# Patient Record
Sex: FEMALE | Race: BLACK OR AFRICAN AMERICAN | NOT HISPANIC OR LATINO | Employment: UNEMPLOYED | URBAN - METROPOLITAN AREA
[De-identification: names, ages, dates, MRNs, and addresses within clinical notes are randomized per-mention and may not be internally consistent; named-entity substitution may affect disease eponyms.]

---

## 2020-03-20 ENCOUNTER — APPOINTMENT (OUTPATIENT)
Dept: ULTRASOUND IMAGING | Facility: CLINIC | Age: 30
End: 2020-03-20
Attending: PHYSICIAN ASSISTANT
Payer: MEDICAID

## 2020-03-20 ENCOUNTER — HOSPITAL ENCOUNTER (EMERGENCY)
Facility: CLINIC | Age: 30
Discharge: HOME | End: 2020-03-21
Attending: PHYSICIAN ASSISTANT | Admitting: PHYSICIAN ASSISTANT
Payer: MEDICAID

## 2020-03-20 DIAGNOSIS — Z3A.16 16 WEEKS GESTATION OF PREGNANCY: ICD-10-CM

## 2020-03-20 DIAGNOSIS — R42 LIGHTHEADEDNESS: ICD-10-CM

## 2020-03-20 DIAGNOSIS — R10.9 ABDOMINAL CRAMPING: ICD-10-CM

## 2020-03-20 LAB
ALBUMIN UR-MCNC: NEGATIVE MG/DL
ANION GAP SERPL CALCULATED.3IONS-SCNC: 7 MMOL/L (ref 3–14)
APPEARANCE UR: CLEAR
BACTERIA #/AREA URNS HPF: ABNORMAL /HPF
BASOPHILS # BLD AUTO: 0 10E9/L (ref 0–0.2)
BASOPHILS NFR BLD AUTO: 0.4 %
BILIRUB UR QL STRIP: NEGATIVE
BUN SERPL-MCNC: 6 MG/DL (ref 7–30)
CALCIUM SERPL-MCNC: 9.4 MG/DL (ref 8.5–10.1)
CHLORIDE SERPL-SCNC: 104 MMOL/L (ref 94–109)
CO2 SERPL-SCNC: 24 MMOL/L (ref 20–32)
COLOR UR AUTO: ABNORMAL
CREAT SERPL-MCNC: 0.58 MG/DL (ref 0.52–1.04)
DIFFERENTIAL METHOD BLD: NORMAL
EOSINOPHIL # BLD AUTO: 0.1 10E9/L (ref 0–0.7)
EOSINOPHIL NFR BLD AUTO: 1.3 %
ERYTHROCYTE [DISTWIDTH] IN BLOOD BY AUTOMATED COUNT: 13.1 % (ref 10–15)
GFR SERPL CREATININE-BSD FRML MDRD: >90 ML/MIN/{1.73_M2}
GLUCOSE SERPL-MCNC: 82 MG/DL (ref 70–99)
GLUCOSE UR STRIP-MCNC: NEGATIVE MG/DL
HCG UR QL: POSITIVE
HCT VFR BLD AUTO: 35.8 % (ref 35–47)
HGB BLD-MCNC: 11.7 G/DL (ref 11.7–15.7)
HGB UR QL STRIP: NEGATIVE
IMM GRANULOCYTES # BLD: 0.1 10E9/L (ref 0–0.4)
IMM GRANULOCYTES NFR BLD: 1 %
KETONES UR STRIP-MCNC: NEGATIVE MG/DL
LEUKOCYTE ESTERASE UR QL STRIP: NEGATIVE
LYMPHOCYTES # BLD AUTO: 2.7 10E9/L (ref 0.8–5.3)
LYMPHOCYTES NFR BLD AUTO: 32.7 %
MCH RBC QN AUTO: 30.1 PG (ref 26.5–33)
MCHC RBC AUTO-ENTMCNC: 32.7 G/DL (ref 31.5–36.5)
MCV RBC AUTO: 92 FL (ref 78–100)
MONOCYTES # BLD AUTO: 1 10E9/L (ref 0–1.3)
MONOCYTES NFR BLD AUTO: 11.7 %
NEUTROPHILS # BLD AUTO: 4.4 10E9/L (ref 1.6–8.3)
NEUTROPHILS NFR BLD AUTO: 52.9 %
NITRATE UR QL: NEGATIVE
NRBC # BLD AUTO: 0 10*3/UL
NRBC BLD AUTO-RTO: 0 /100
PH UR STRIP: 6 PH (ref 5–7)
PLATELET # BLD AUTO: 229 10E9/L (ref 150–450)
POTASSIUM SERPL-SCNC: 3.7 MMOL/L (ref 3.4–5.3)
RBC # BLD AUTO: 3.89 10E12/L (ref 3.8–5.2)
RBC #/AREA URNS AUTO: <1 /HPF (ref 0–2)
SODIUM SERPL-SCNC: 135 MMOL/L (ref 133–144)
SOURCE: ABNORMAL
SP GR UR STRIP: 1 (ref 1–1.03)
SQUAMOUS #/AREA URNS AUTO: <1 /HPF (ref 0–1)
UROBILINOGEN UR STRIP-MCNC: NORMAL MG/DL (ref 0–2)
WBC # BLD AUTO: 8.2 10E9/L (ref 4–11)
WBC #/AREA URNS AUTO: 0 /HPF (ref 0–5)

## 2020-03-20 PROCEDURE — 76805 OB US >/= 14 WKS SNGL FETUS: CPT

## 2020-03-20 PROCEDURE — 81025 URINE PREGNANCY TEST: CPT | Performed by: PHYSICIAN ASSISTANT

## 2020-03-20 PROCEDURE — 25800030 ZZH RX IP 258 OP 636: Performed by: PHYSICIAN ASSISTANT

## 2020-03-20 PROCEDURE — 96361 HYDRATE IV INFUSION ADD-ON: CPT

## 2020-03-20 PROCEDURE — 80048 BASIC METABOLIC PNL TOTAL CA: CPT | Performed by: PHYSICIAN ASSISTANT

## 2020-03-20 PROCEDURE — 99284 EMERGENCY DEPT VISIT MOD MDM: CPT | Mod: 25

## 2020-03-20 PROCEDURE — 85025 COMPLETE CBC W/AUTO DIFF WBC: CPT | Performed by: PHYSICIAN ASSISTANT

## 2020-03-20 PROCEDURE — 25000132 ZZH RX MED GY IP 250 OP 250 PS 637: Performed by: PHYSICIAN ASSISTANT

## 2020-03-20 PROCEDURE — 81001 URINALYSIS AUTO W/SCOPE: CPT | Performed by: PHYSICIAN ASSISTANT

## 2020-03-20 PROCEDURE — 96360 HYDRATION IV INFUSION INIT: CPT

## 2020-03-20 RX ORDER — MECLIZINE HYDROCHLORIDE 25 MG/1
25 TABLET ORAL ONCE
Status: COMPLETED | OUTPATIENT
Start: 2020-03-20 | End: 2020-03-20

## 2020-03-20 RX ADMIN — MECLIZINE HYDROCHLORIDE 25 MG: 25 TABLET ORAL at 22:17

## 2020-03-20 RX ADMIN — SODIUM CHLORIDE 1000 ML: 9 INJECTION, SOLUTION INTRAVENOUS at 22:15

## 2020-03-20 ASSESSMENT — ENCOUNTER SYMPTOMS
LIGHT-HEADEDNESS: 1
FEVER: 0
DYSURIA: 0
DIFFICULTY URINATING: 0
ABDOMINAL PAIN: 1
HEADACHES: 0
DIZZINESS: 1
NUMBNESS: 0
NAUSEA: 1
CONSTIPATION: 0
VOMITING: 1
WEAKNESS: 0
DIARRHEA: 0

## 2020-03-20 NOTE — ED AVS SNAPSHOT
[facility] Emergency Department  [address]  [facility] [address]  Phone:  [phone]  Fax:  [phone]                                    Svetlana Baldwin   MRN: [id number]    Department:  [facility] Emergency Department   Date of Visit:  3/20/2020           After Visit Summary Signature Page    I have received my discharge instructions, and my questions have been answered. I have discussed any challenges I see with this plan with the nurse or doctor.    ..........................................................................................................................................  Patient/Patient Representative Signature      ..........................................................................................................................................  Patient Representative Print Name and Relationship to Patient    ..................................................               ................................................  Date                                   Time    ..........................................................................................................................................  Reviewed by Signature/Title    ...................................................              ..............................................  Date                                               Time          22EPIC Rev 08/18

## 2020-03-21 VITALS
TEMPERATURE: 98.4 F | SYSTOLIC BLOOD PRESSURE: 105 MMHG | RESPIRATION RATE: 16 BRPM | OXYGEN SATURATION: 100 % | HEART RATE: 79 BPM | DIASTOLIC BLOOD PRESSURE: 70 MMHG

## 2020-03-21 NOTE — ED PROVIDER NOTES
History     Chief Complaint:  Dizziness    The history is provided by the patient. The history is limited by a language barrier. A  was used.      Svetlana Baldwin is a 30 year old, English-speaking female, , approximately 15 weeks pregnant based on her last menstrual period four months ago, who presents for evaluation of dizziness. Throughout this pregnancy, the patient has been experiencing intermittent lightheaded sensation and generalized weakness which has become more consistent, thus prompting presentation to the ED. Here, she does endorse feeling dizzy. She describes it as feeling off balance when she moves and sometimes her vision goes dark upon standing. She denies any room spinning sensation, and states the dizziness improves when she lies down. She has had lower abdominal pain and been nauseated and most recently vomited yesterday. She denies any current headache, fever, weakness or numbness in extremities, stool changes, urinary symptoms, or vaginal bleeding. She has yet to see an OB for this pregnancy and has received no prenatal care; she found out she was pregnant via a home test. She recently [place] to this country also; she denies having any complications with her last pregnancy. She also denies any history of vertigo.     Allergies:  No Known Allergies     Medications:    The patient is currently on no regular medications.    Past Medical History:    Migraines    Past Surgical History:    Unknown surgical history.     Family History:    Unknown family history.     Social History:  Marital Status:   [2]  Negative for tobacco use.  Negative for alcohol use.     Review of Systems   Constitutional: Negative for fever.   Eyes: Positive for visual disturbance.   Gastrointestinal: Positive for abdominal pain, nausea and vomiting. Negative for constipation and diarrhea.   Genitourinary: Negative for difficulty urinating, dysuria and vaginal bleeding.    Neurological: Positive for dizziness and light-headedness. Negative for weakness, numbness and headaches.   All other systems reviewed and are negative.    Physical Exam     Patient Vitals for the past 24 hrs:   BP Temp Pulse Heart Rate Resp SpO2   03/21/20 0033 -- -- -- -- 16 --   03/21/20 0000 105/70 -- 79 -- -- 100 %   03/20/20 2330 112/71 -- 79 -- -- 100 %   03/20/20 2245 -- -- -- -- -- 100 %   03/20/20 2230 108/64 -- 72 -- -- 100 %   03/20/20 2135 118/64 98.4  F (36.9  C) -- 85 20 97 %      Orthostatic Vitals taken @ 2210:  Standing Orthostatic BP: 107/73 Standing Orthostatic Pulse: 77 bpm  Sitting Orthostatic BP: 111/73 Sitting Orthostatic Pulse: 79 bpm  Lying Orthostatic BP: 120/70 Lying Orthostatic Pulse: 85 bpm    Physical Exam  General: Non-toxic appearing. Resting comfortably on the bed.   Skin: Good turgor, no rash, no unusual bruising or prominent lesions.  HEENT: Head: Normocephalic, atraumatic, no visible or palpable masses, depressions, or scarring.  Eyes: Conjunctiva clear, sclera non-icteric, EOM intact, PERRL.   Throat/pharynx: Mucous membranes moist, no mucosal lesions. Mucosa non-inflamed, no tonsillar hypertrophy or exudate.   Neck: Supple, without lesions or adenopathy.  No neck stiffness.  Cardiac: Normal rate and regular rhythm, no murmur or gallop.   Lungs: Clear to auscultation and percussion   Abdomen: Mild discomfort throughout the lower abdomen without focal tenderness, rebound or guarding.  No pain at McBurney's.  Negative Carroll's.  Musculoskeletal: Normal gait and station. Full strength in upper and lower extremities.   Neurologic: Alert and oriented x3. GCS 15. CN II-VII intact. Normal finger to nose, rapid hand movements, heel to shin. No pronator drift.   Psychiatric: Intact recent and remote memory, judgment and insight, normal mood and affect.     Emergency Department Course   Imaging:  Radiographic findings were communicated with the patient who voiced understanding of the  findings.  US OB, >14 weeks w Transvaginal:  1.  Single living intrauterine gestation.   2.  Based on this ultrasound, composite age of 16 weeks 5 days with EDC 2020.   3.  No abnormalities are evident, as per radiology.     Laboratory:  CBC: WBC: 8.2, HGB: 11.7, PLT: 229  BMP: BUN: 6 (L), o/w WNL (Creatinine: 0.58)    UA with Microscopic: Bacteria: Few (A), o/w WNL   HCG UPT: Positive (A)    Procedures:  None     Interventions:  2215 NS 1L IV  7 Meclizine, 25 mg, PO    Emergency Department Course:  Nursing notes and vitals reviewed.   : I performed an exam of the patient as documented above.      IV was inserted and blood was drawn for laboratory testing, results above. Medicine administered as documented above.   The patient provided a urine sample here in the emergency department. This was sent for laboratory testing, findings above.     2215: Orthostatics completed, as noted above.     The patient was sent for an OB ultrasound while in the emergency department, results above.      0017: I rechecked the patient and discussed the results of her workup thus far. She feels improved after the interventions above.     Findings and plan explained to the Patient. Patient discharged home with instructions regarding supportive care, medications, and reasons to return. The importance of close follow-up was reviewed. She was given a referral for an OBGYN.     I personally reviewed the laboratory and imaging results with the Patient and answered all related questions prior to discharge.    Impression & Plan      Medical Decision Making:  Svetlana Baldwin is a 30 year old female who presents with lightheaded sensation and abdominal cramping in pregnancy.  Details of the patient's history can be noted in the HPI.  Differentials considered included CVA, cerebral venous thrombosis, vasovagal, appendicitis, spontaneous , round ligament pain, UTI, pyelonephritis, gallbladder pathology, amongst  others.  On my exam, patient was neurologically intact.  She had mild lower abdominal cramping without focal tenderness.  Basic labs were obtained, which showed no acute electrolyte abnormalities.  She was mildly symptomatic on orthostatic blood pressures.  Fluids were given, and patient felt improved after this.  Urine was negative for infection.  Pelvic ultrasound indicated intrauterine pregnancy at 16 weeks and 5 days.    The patient otherwise looks comfortable here, I suspect that she has normal abdominal cramping in pregnancy.  We did discuss prenatal care and including adding a daily vitamin, limiting caffeine, etc.  She was given local OB/GYN information and she will call Monday to schedule follow-up.  Suspect that some of her lightheaded sensation is due to volume depletion and volume changes given her pregnancy.  She otherwise is neurologically intact and has no other symptoms to suggest cerebral venous thrombosis or CVA.  I encouraged her to stay well-hydrated at home, and have slower movements when going from sitting or lying to standing.  She will return if she has changing worsening abdominal pain, persistent vaginal bleeding, severe headache, numbness or weakness, new concerns.  All questions were answered prior to discharge.  She was in agreement with the treatment plan as stated above.    Diagnosis:    ICD-10-CM    1. 16 weeks gestation of pregnancy  Z3A.16    2. Abdominal cramping  R10.9    3. Lightheadedness  R42        Disposition:  discharged to home    Scribe Disclosure:  I, Gogo Alvin, am serving as a scribe on 3/20/2020 at 9:44 PM to personally document services performed by Liliana Hughes PA based on my observations and the provider's statements to me.     3/20/2020   [facility] EMERGENCY DEPARTMENT      This was created at least in part with a voice recognition software. Mistakes/typos may be present.      Liliana Hughes PA  03/21/20 0151

## 2020-03-21 NOTE — DISCHARGE INSTRUCTIONS
Today, your ultrasound shows that you have a pregnancy at about 16 weeks and 5 days.  The baby looks normal.  Your blood work is normal.  Your blood pressure was slightly low when used good.  Make sure you are staying well-hydrated at home.  Move more slowly than normal to help decrease her symptoms.  Call the OB/GYN number above to schedule follow-up.  Take a daily prenatal vitamin if possible.

## 2020-03-21 NOTE — ED TRIAGE NOTES
Pt states lightheadedness that changes with movement, also notes lower abdominal pain. Denies cough, dyspnea, or chest pain.   Pt approx 15 weeks gestation. ABCs intact GCS 15 Has not had prenatal care

## 2020-05-20 ENCOUNTER — TELEPHONE (OUTPATIENT)
Dept: OBGYN | Facility: CLINIC | Age: 30
End: 2020-05-20

## 2020-05-20 ENCOUNTER — PRENATAL OFFICE VISIT (OUTPATIENT)
Dept: NURSING | Facility: CLINIC | Age: 30
End: 2020-05-20

## 2020-05-20 DIAGNOSIS — O09.92 SUPERVISION OF HIGH RISK PREGNANCY IN SECOND TRIMESTER: Primary | ICD-10-CM

## 2020-05-20 PROCEDURE — 99207 ZZC NO CHARGE NURSE ONLY: CPT

## 2020-05-20 RX ORDER — PRENATAL VIT/IRON FUM/FOLIC AC 27MG-0.8MG
1 TABLET ORAL DAILY
Qty: 90 TABLET | Refills: 3
Start: 2020-05-20 | End: 2020-05-27

## 2020-05-20 SDOH — ECONOMIC STABILITY: FOOD INSECURITY: WITHIN THE PAST 12 MONTHS, THE FOOD YOU BOUGHT JUST DIDN'T LAST AND YOU DIDN'T HAVE MONEY TO GET MORE.: NEVER TRUE

## 2020-05-20 SDOH — ECONOMIC STABILITY: FOOD INSECURITY: WITHIN THE PAST 12 MONTHS, YOU WORRIED THAT YOUR FOOD WOULD RUN OUT BEFORE YOU GOT MONEY TO BUY MORE.: NEVER TRUE

## 2020-05-20 SDOH — ECONOMIC STABILITY: TRANSPORTATION INSECURITY
IN THE PAST 12 MONTHS, HAS THE LACK OF TRANSPORTATION KEPT YOU FROM MEDICAL APPOINTMENTS OR FROM GETTING MEDICATIONS?: NO

## 2020-05-20 SDOH — ECONOMIC STABILITY: TRANSPORTATION INSECURITY
IN THE PAST 12 MONTHS, HAS LACK OF TRANSPORTATION KEPT YOU FROM MEETINGS, WORK, OR FROM GETTING THINGS NEEDED FOR DAILY LIVING?: NO

## 2020-05-20 SDOH — ECONOMIC STABILITY: INCOME INSECURITY: HOW HARD IS IT FOR YOU TO PAY FOR THE VERY BASICS LIKE FOOD, HOUSING, MEDICAL CARE, AND HEATING?: NOT HARD AT ALL

## 2020-05-20 SDOH — HEALTH STABILITY: MENTAL HEALTH: HOW OFTEN DO YOU HAVE A DRINK CONTAINING ALCOHOL?: NEVER

## 2020-05-20 NOTE — PROGRESS NOTES
Chief Complaint   Patient presents with     Prenatal Care     New Prenatal Nurse Telephone Visit   25w3d      Estimated Date of Delivery: Aug 30, 2020    Initial There were no vitals taken for this visit. There is no height or weight on file to calculate BMI.  BP completed using cuff size: NA (Not Taken)    Questioned patient about current smoking habits.  Pt. has never smoked.        New Prenatal telephone visit done with [facility]  # [id number] and . Prenatal book and folder (containing standard educational hand-outs and brochures) to be given to patient at NPN visit. Information in folder reviewed. Questions answered. Brochure given on optional screening available to assess chromosomal anomalies. Pt advised to call the clinic if she has any questions or concerns related to her pregnancy. Prenatal labs obtained. New prenatal visit scheduled on 20 with Dr Das.        No results found for: PAP        Patient supplied answers from flow sheet for:  Prenatal OB Questionnaire.  Past Medical History  Diabetes?: No  Hypertension : No  Heart disease, mitral valve prolapse or rheumatic fever?: No  An autoimmune disease such as lupus or rheumatoid arthritis?: No  Kidney disease or urinary tract infection?: No  Epilepsy, seizures or spells?: No  Migraine headaches?: No  A stroke or loss of function or sensation?: No  Any other neurological problems?: No  Have you ever been treated for depression?: No  Are you having problems with crying spells or loss of self-esteem?: No  Have you ever required psychiatric care?: No  Have you ever had hepatitis, liver disease or jaundice?: No  Have you been treated for blood clots in your veins, deep vein thromosis, inflammation in the veins, thrombosis, phlebitis, pulmonary embolism or varicosities?: No  Have you had excessive bleeding after surgery or dental work?: No  Do you bleed more than other women after a cut or scratch?: No  Do you have a history of anemia?:  No  Have you ever had thyroid problems or taken thyroid medication?: No   Do you have any endocrine problems?: No  Have you ever been in a major accident or suffered serious trauma?: No  Within the last year, has anyone hit, slapped, kicked or otherwise hurt you?: No  In the last year, has anyone forced you to have sex when you didn't want to?: No    Past Medical History 2   Have you ever received a blood transfusion?: No  Would you refuse a blood transfusion if a doctor judged it to be medically necessary?: No   If you answered Yes, would you rather die than receive a blood transfusion?: No  If you answered Yes, is this for Anabaptism reasons?: No  Does anyone in your home smoke?: No  Do you use tobacco products?: No  Do you drink beer, wine or hard liquor?: No  Do you use any of the following: marijuana, speed, cocaine, heroin, hallucinogens or other drugs?: No   Is your blood type Rh negative?: No  Have you ever had abnormal antibodies in your blood?: Unknown  Have you ever had asthma?: No  Have you ever had tuberculosis?: No  Do you have any allergies to drugs or over-the-counter medications?: No  Allergies: Dust Mites, Aspartame, Ethanol, Venlafaxine, Hydrochloride, Sertraline: No  Have you had any breast problems?: No  Have you ever ?: (!) Yes  Have you had any gynecological surgical procedures such as cervical conization, a LEEP procedure, laser treatment, cryosurgery of the cervix or a dilation and curettage, etc?: No  Have you ever had any other surgical procedures?: No  Have you been hospitalized for a nonsurgical reason excluding normal delivery?: No  Have you ever had any anesthetic complications?: No  Have you ever had an abnormal pap smear?: Unknown    Past Medical History (Continued)  Do you have a history of abnormalities of the uterus?: Unknown  Did your mother take JEREMIAH or any other hormones when she was pregnant with you?: Unknown  Did it take you more than a year to become pregnant?:  No  Have you ever been evaluated or treated for infertility?: No  Is there a history of medical problems in your family, which you feel may be important to this pregnancy?: No  Do you have any other problems we have not asked about which you feel may be important to this pregnancy?: No    Symptoms since last menstrual period  Current medications, including over-the-counter medications, you are using? (If not applicable answer none): pnv  Will the patient be 35 years old or older at the time of delivery?: No    Has the patient, baby's father or anyone in either family had:  Thalassemia (Italian, Greek, Mediterranean or  background only) and an MCV result less than 80?: Unknown  Neural tube defect such as meningomyelocele, spina bifida or anencephaly?: No  Congenital heart defect?: No  Down's Syndrome?: No  Lorenzo-Sachs disease (Evangelical, Cajun, Croatian-Archer)?: No  Sickle cell disease or trait ()?: No  Hemophilia or other inherited problems of blood?: No  Muscular dystrophy?: No  Cystic fibrosis?: No  Hartford's chorea?: No  Mental retardation/autism?: No  If yes, was the person tested for fragile X?: No  Any other inherited genetic or chromosomal disorder?: No  Maternal metabolic disorder (e.g Insulin-dependent diabetes, PKU)?: No  A child with birth defects not listed above?: No  Recurrent pregnancy loss or stillbirth?: No   Has the patient had any medications/street drugs/alcohol since her last menstrual period?: No  Does the patient or baby's father have any other genetic risks?: No    Infection History   Do you object to being tested for Hepatitis B?: No  Do you object to being tested for HIV?: No   Do you feel that you are at high risk for coming in contact with the AIDS virus?: No  Have you ever been treated for tuberculosis?: No  Have you ever had a positive skin test for tuberculosis?: No  Do you live with someone who has tuberculosis?: No  Have you ever been exposed to tuberculosis?: No  Do you  have genital herpes?: No  Does your partner have genital herpes?: No  Have you had a viral illness since your last period?: No  Have you ever had gonorrhea, chlamydia, syphilis, venereal warts, trichomoniasis, pelvic inflammatory disease or any other sexually transmitted disease?: No  Do you know if you are a genital group B streptococcus carrier?: Unknown  Have you had chicken pox/varicella?: Unknown   Have you been vaccinated against chicken Pox?: Unknown  Have you had any other infectious diseases?: No    Sari Gomes RN

## 2020-05-20 NOTE — TELEPHONE ENCOUNTER
calling:  G2.P1  Moved here in November from the [place].  Was seen in ER 3/20. Did not seek prenatal care because did not have insurance.    Patient is requesting to establish care with [facility] .  Has not had any prenatal care.   Per [facility] protocol, patients are equal to or greater than 24 weeks need prior approval from [facility] before scheduling any prenatal appointments.      2 Para 1    LMP ? EDC 20 (ultrasound in ER @ 16 wks    U/S done? In ER    Previous C-sec? no    List all major health problems: no    List all complications of past deliveries: ( Ask specifically about Gestational Diabetes, HTN and preeclampsia) no    List all current pregnancy issues: (Again, ask specifically about Gestational Diabetes, HTN and preeclampsia) ?    List current medication list PNV    Per protocol, message routed to MD on-call for direction.   Sari Gomes RN

## 2020-05-22 ENCOUNTER — HOSPITAL ENCOUNTER (OUTPATIENT)
Dept: ULTRASOUND IMAGING | Facility: CLINIC | Age: 30
Discharge: HOME | End: 2020-05-22
Payer: MEDICAID

## 2020-05-22 DIAGNOSIS — O09.92 SUPERVISION OF HIGH RISK PREGNANCY IN SECOND TRIMESTER: ICD-10-CM

## 2020-05-22 PROCEDURE — T1013 SIGN LANG/ORAL INTERPRETER: HCPCS | Mod: U3

## 2020-05-22 PROCEDURE — T1013 SIGN LANG/ORAL INTERPRETER: HCPCS | Mod: U3 | Performed by: OBSTETRICS & GYNECOLOGY

## 2020-05-22 PROCEDURE — 76805 OB US >/= 14 WKS SNGL FETUS: CPT

## 2020-05-26 ENCOUNTER — PRENATAL OFFICE VISIT (OUTPATIENT)
Dept: OBGYN | Facility: CLINIC | Age: 30
End: 2020-05-26
Payer: MEDICAID

## 2020-05-26 ENCOUNTER — TRANSCRIBE ORDERS (OUTPATIENT)
Dept: MATERNAL FETAL MEDICINE | Facility: CLINIC | Age: 30
End: 2020-05-26

## 2020-05-26 VITALS
HEIGHT: 63 IN | DIASTOLIC BLOOD PRESSURE: 62 MMHG | BODY MASS INDEX: 28.77 KG/M2 | SYSTOLIC BLOOD PRESSURE: 92 MMHG | WEIGHT: 162.4 LBS

## 2020-05-26 DIAGNOSIS — O09.32 LATE PRENATAL CARE AFFECTING PREGNANCY IN SECOND TRIMESTER: Primary | ICD-10-CM

## 2020-05-26 DIAGNOSIS — O26.90 PREGNANCY RELATED CONDITION, ANTEPARTUM: Primary | ICD-10-CM

## 2020-05-26 DIAGNOSIS — O09.92 SUPERVISION OF HIGH RISK PREGNANCY IN SECOND TRIMESTER: ICD-10-CM

## 2020-05-26 LAB
ABO + RH BLD: NORMAL
ABO + RH BLD: NORMAL
BLD GP AB SCN SERPL QL: NORMAL
BLOOD BANK CMNT PATIENT-IMP: NORMAL
ERYTHROCYTE [DISTWIDTH] IN BLOOD BY AUTOMATED COUNT: 13.4 % (ref 10–15)
HCT VFR BLD AUTO: 31.3 % (ref 35–47)
HGB BLD-MCNC: 10 G/DL (ref 11.7–15.7)
MCH RBC QN AUTO: 30.3 PG (ref 26.5–33)
MCHC RBC AUTO-ENTMCNC: 31.9 G/DL (ref 31.5–36.5)
MCV RBC AUTO: 95 FL (ref 78–100)
PLATELET # BLD AUTO: 205 10E9/L (ref 150–450)
RBC # BLD AUTO: 3.3 10E12/L (ref 3.8–5.2)
SPECIMEN EXP DATE BLD: NORMAL
WBC # BLD AUTO: 9 10E9/L (ref 4–11)

## 2020-05-26 PROCEDURE — 86901 BLOOD TYPING SEROLOGIC RH(D): CPT | Performed by: OBSTETRICS & GYNECOLOGY

## 2020-05-26 PROCEDURE — 82950 GLUCOSE TEST: CPT | Performed by: OBSTETRICS & GYNECOLOGY

## 2020-05-26 PROCEDURE — 87389 HIV-1 AG W/HIV-1&-2 AB AG IA: CPT | Performed by: OBSTETRICS & GYNECOLOGY

## 2020-05-26 PROCEDURE — 86900 BLOOD TYPING SEROLOGIC ABO: CPT | Performed by: OBSTETRICS & GYNECOLOGY

## 2020-05-26 PROCEDURE — 86762 RUBELLA ANTIBODY: CPT | Performed by: OBSTETRICS & GYNECOLOGY

## 2020-05-26 PROCEDURE — 85027 COMPLETE CBC AUTOMATED: CPT | Performed by: OBSTETRICS & GYNECOLOGY

## 2020-05-26 PROCEDURE — 87086 URINE CULTURE/COLONY COUNT: CPT | Performed by: OBSTETRICS & GYNECOLOGY

## 2020-05-26 PROCEDURE — 99202 OFFICE O/P NEW SF 15 MIN: CPT | Performed by: OBSTETRICS & GYNECOLOGY

## 2020-05-26 PROCEDURE — G0499 HEPB SCREEN HIGH RISK INDIV: HCPCS | Performed by: OBSTETRICS & GYNECOLOGY

## 2020-05-26 PROCEDURE — 36415 COLL VENOUS BLD VENIPUNCTURE: CPT | Performed by: OBSTETRICS & GYNECOLOGY

## 2020-05-26 PROCEDURE — 87591 N.GONORRHOEAE DNA AMP PROB: CPT | Performed by: OBSTETRICS & GYNECOLOGY

## 2020-05-26 PROCEDURE — 87491 CHLMYD TRACH DNA AMP PROBE: CPT | Performed by: OBSTETRICS & GYNECOLOGY

## 2020-05-26 PROCEDURE — 86780 TREPONEMA PALLIDUM: CPT | Performed by: OBSTETRICS & GYNECOLOGY

## 2020-05-26 PROCEDURE — T1013 SIGN LANG/ORAL INTERPRETER: HCPCS | Mod: U3 | Performed by: OBSTETRICS & GYNECOLOGY

## 2020-05-26 PROCEDURE — 86787 VARICELLA-ZOSTER ANTIBODY: CPT | Performed by: OBSTETRICS & GYNECOLOGY

## 2020-05-26 PROCEDURE — 86850 RBC ANTIBODY SCREEN: CPT | Performed by: OBSTETRICS & GYNECOLOGY

## 2020-05-26 ASSESSMENT — MIFFLIN-ST. JEOR: SCORE: 1425.77

## 2020-05-26 NOTE — NURSING NOTE
Chief Complaint   Patient presents with     Prenatal Care   26w2d  No concerns   Here with spouse interpreting  NPN late care visit - [place]    initial BP 92/62   Wt 73.7 kg (162 lb 6.4 oz)  There is no height or weight on file to calculate BMI.  BP completed using cuff size regular.  Scarlett Cuevas CMA

## 2020-05-26 NOTE — PROGRESS NOTES
New OB Visit  Svetlana Sanches   May 26, 2020   26w2d     Subjective: Svetlana Sanches 30 year old  at 26w2d dated by LMP, midtrimester ultrasound here today for initial OB visit. Patient reports No Problems. Denies cramping and vaginal spotting.     Patient KORI (Turkish).   translating      Gyn History:   Menses: LMP: No LMP recorded. Patient is pregnant. frequency: q month  Sexually transmitted disease history: none.    Occupation: none  Exercise: active  Diet: balanced  H/o Chicken Pox or Varicella Vaccination: Unknown    History of GDM: No   Hx PTL : No   History of HTN in pregnancy: No   Shoulder dystocia: No   Vacuum Extraction: No   PPH: No   3rd of 4th degree laceration: No   Other complications: No    Since her last LMP she denies use of alcohol, tobacco and street drugs.    OBhx:  x 1  OB History    Para Term  AB Living   2 1 1 0 0 1   SAB TAB Ectopic Multiple Live Births   0 0 0 0 1      # Outcome Date GA Lbr Dhruv/2nd Weight Sex Delivery Anes PTL Lv   2 Current            1 Term 11   3.67 kg (8 lb 1.5 oz) M  None  SHAHANA       ROS: Ten point review of systems was reviewed and negative except the above.    HISTORY:  No past medical history on file.  Past Surgical History:   Procedure Laterality Date     NO HISTORY OF SURGERY       No family history on file.  Social History     Socioeconomic History     Marital status:      Spouse name: Mauricio Bennett     Number of children: 1     Years of education: None     Highest education level: 4th grade   Occupational History     Occupation: stay at home   Social Needs     Financial resource strain: Not hard at all     Food insecurity     Worry: Never true     Inability: Never true     Transportation needs     Medical: No     Non-medical: No   Tobacco Use     Smoking status: Never Smoker     Smokeless tobacco: Never Used   Substance and Sexual Activity     Alcohol use: Never     Frequency: Never      Drug use: Never     Sexual activity: Yes     Partners: Male   Lifestyle     Physical activity     Days per week: None     Minutes per session: None     Stress: None   Relationships     Social connections     Talks on phone: None     Gets together: None     Attends Jain service: None     Active member of club or organization: None     Attends meetings of clubs or organizations: None     Relationship status: None     Intimate partner violence     Fear of current or ex partner: None     Emotionally abused: None     Physically abused: None     Forced sexual activity: None   Other Topics Concern     None   Social History Narrative     None     Current Outpatient Medications   Medication Sig     Prenatal Vit-Fe Fumarate-FA (PRENATAL MULTIVITAMIN W/IRON) 27-0.8 MG tablet Take 1 tablet by mouth daily     No current facility-administered medications for this visit.      No Known Allergies    Past medical, surgical, social and family history were reviewed and updated in EPIC.      EXAM:  BP 92/62   Wt 73.7 kg (162 lb 6.4 oz)      Gen:  no acute distress, comfortable  HENT: No scleral injection or icterus  CV: Regular rate and rhythm, no murmur  Resp: CTAB, Normal work of breathing, no cough  GI: Abdomen soft, non-tender. No masses, organomegaly  Skin: No suspicious lesions or rashes  Psychiatric: mentation appears normal and affect bright      +FHT 150s      Prenatal labs today    CBC RESULTS:   Recent Labs   Lab Test 20  2207   WBC 8.2   RBC 3.89   HGB 11.7   HCT 35.8   MCV 92   MCH 30.1   MCHC 32.7   RDW 13.1          ASSESSMENT:  30 year old  at 26w2d dated by midtrimester U/S and LMP here for NOB visit.  KORI (Bengali)  Immigrated form the [facility] 2019  PLAN:    1)Prenatal labs reviewed. She has no questions.  2) EDUCATION : RECOMMENDED WEIGHT GAIN: 25-35 lbs given There is no height or weight on file to calculate BMI..   - Instructed on best evidence for: healthy diet and foods to avoid;  exercise and activity during pregnancy; and maintenance of a generally healthy lifestyle. Reviewed early pregnancy education, provider coverage, labor and delivery, and prenatal visits.  Discussed the harms, benefits, side effects and alternative therapies for current prescribed and OTC medications.  - recommend PNV  3)  NOB labs and Varicella titer today  GCT todat  Follow up in 2 weeks with TDaP. She is encouraged to call sooner with questions or concerns.    Sim Das MD   Obstetrics and Gynecology

## 2020-05-27 ENCOUNTER — MYC REFILL (OUTPATIENT)
Dept: NURSING | Facility: CLINIC | Age: 30
End: 2020-05-27

## 2020-05-27 DIAGNOSIS — O09.92 SUPERVISION OF HIGH RISK PREGNANCY IN SECOND TRIMESTER: ICD-10-CM

## 2020-05-27 LAB
BACTERIA SPEC CULT: NO GROWTH
C TRACH DNA SPEC QL NAA+PROBE: NEGATIVE
GLUCOSE 1H P 50 G GLC PO SERPL-MCNC: 85 MG/DL (ref 60–129)
HBV SURFACE AG SERPL QL IA: NONREACTIVE
HIV 1+2 AB+HIV1 P24 AG SERPL QL IA: NONREACTIVE
N GONORRHOEA DNA SPEC QL NAA+PROBE: NEGATIVE
RUBV IGG SERPL IA-ACNC: 72 IU/ML
SPECIMEN SOURCE: NORMAL
T PALLIDUM AB SER QL: NONREACTIVE
VZV IGG SER QL IA: 0.6 AI (ref 0–0.8)

## 2020-05-27 RX ORDER — PRENATAL VIT/IRON FUM/FOLIC AC 27MG-0.8MG
1 TABLET ORAL DAILY
Qty: 90 TABLET | Refills: 3 | Status: SHIPPED | OUTPATIENT
Start: 2020-05-27 | End: 2021-02-17

## 2020-06-10 ENCOUNTER — PRENATAL OFFICE VISIT (OUTPATIENT)
Dept: OBGYN | Facility: CLINIC | Age: 30
End: 2020-06-10
Payer: MEDICAID

## 2020-06-10 VITALS — BODY MASS INDEX: 29.39 KG/M2 | DIASTOLIC BLOOD PRESSURE: 62 MMHG | WEIGHT: 165.9 LBS | SYSTOLIC BLOOD PRESSURE: 100 MMHG

## 2020-06-10 DIAGNOSIS — O09.93 SUPERVISION OF HIGH RISK PREGNANCY IN THIRD TRIMESTER: Primary | ICD-10-CM

## 2020-06-10 PROCEDURE — T1013 SIGN LANG/ORAL INTERPRETER: HCPCS | Mod: U3 | Performed by: OBSTETRICS & GYNECOLOGY

## 2020-06-10 PROCEDURE — 90715 TDAP VACCINE 7 YRS/> IM: CPT | Performed by: OBSTETRICS & GYNECOLOGY

## 2020-06-10 PROCEDURE — 90471 IMMUNIZATION ADMIN: CPT | Performed by: OBSTETRICS & GYNECOLOGY

## 2020-06-10 PROCEDURE — 99212 OFFICE O/P EST SF 10 MIN: CPT | Mod: 25 | Performed by: OBSTETRICS & GYNECOLOGY

## 2020-06-10 NOTE — NURSING NOTE
"Chief Complaint   Patient presents with     Prenatal Care   28w3d  Baby active yest and today.  Not much two days ago, however.   No concerns - here with video  . Spouse is at work    initial /62   Wt 75.3 kg (165 lb 14.4 oz)   BMI 29.39 kg/m   Estimated body mass index is 29.39 kg/m  as calculated from the following:    Height as of 5/26/20: 1.6 m (5' 3\").    Weight as of this encounter: 75.3 kg (165 lb 14.4 oz).  BP completed using cuff size regular.  Scarlett Cuevas CMA    "

## 2020-06-10 NOTE — PROGRESS NOTES
Maltese  facilitated appointment as patient KORI.    31yo  at 28w3d here for routine visit.  TDaP today.  GCT normal last visit.  Taking PNV daily.  Baby active.  No ctx.    RTC 2 weeks

## 2020-06-15 ENCOUNTER — PRE VISIT (OUTPATIENT)
Dept: MATERNAL FETAL MEDICINE | Facility: CLINIC | Age: 30
End: 2020-06-15

## 2020-06-17 ENCOUNTER — OFFICE VISIT (OUTPATIENT)
Dept: MATERNAL FETAL MEDICINE | Facility: CLINIC | Age: 30
End: 2020-06-17
Attending: OBSTETRICS & GYNECOLOGY
Payer: MEDICAID

## 2020-06-17 ENCOUNTER — HOSPITAL ENCOUNTER (OUTPATIENT)
Dept: ULTRASOUND IMAGING | Facility: CLINIC | Age: 30
End: 2020-06-17
Attending: OBSTETRICS & GYNECOLOGY
Payer: MEDICAID

## 2020-06-17 DIAGNOSIS — O26.90 PREGNANCY RELATED CONDITION, ANTEPARTUM: ICD-10-CM

## 2020-06-17 DIAGNOSIS — O43.93: Primary | ICD-10-CM

## 2020-06-17 PROCEDURE — 76811 OB US DETAILED SNGL FETUS: CPT

## 2020-06-17 NOTE — PROGRESS NOTES
Please see full imaging report from [employer] program under imaging tab.      Dong Durand MD  Maternal Fetal Medicine

## 2020-06-24 ENCOUNTER — PRENATAL OFFICE VISIT (OUTPATIENT)
Dept: OBGYN | Facility: CLINIC | Age: 30
End: 2020-06-24
Payer: MEDICAID

## 2020-06-24 VITALS — WEIGHT: 166.4 LBS | DIASTOLIC BLOOD PRESSURE: 60 MMHG | BODY MASS INDEX: 29.48 KG/M2 | SYSTOLIC BLOOD PRESSURE: 92 MMHG

## 2020-06-24 DIAGNOSIS — Z34.93 PRENATAL CARE IN THIRD TRIMESTER: Primary | ICD-10-CM

## 2020-06-24 PROCEDURE — 99212 OFFICE O/P EST SF 10 MIN: CPT | Performed by: FAMILY MEDICINE

## 2020-06-24 PROCEDURE — T1013 SIGN LANG/ORAL INTERPRETER: HCPCS | Mod: U3 | Performed by: FAMILY MEDICINE

## 2020-06-24 NOTE — NURSING NOTE
"Chief Complaint   Patient presents with     Prenatal Care   30w3d  Baby active  No concerns  Here with Video Australian     initial BP 92/60   Wt 75.5 kg (166 lb 6.4 oz)   BMI 29.48 kg/m   Estimated body mass index is 29.48 kg/m  as calculated from the following:    Height as of 5/26/20: 1.6 m (5' 3\").    Weight as of this encounter: 75.5 kg (166 lb 6.4 oz).  BP completed using cuff size regular.  Scarlett Cuevas CMA    "

## 2020-06-24 NOTE — PROGRESS NOTES
CC: Here for routine prenatal visit   30 year old y/o  @ 30w3d with Estimated Date of Delivery: Aug 30, 2020     BP 92/60   Wt 75.5 kg (166 lb 6.4 oz)   BMI 29.48 kg/m    See OB flowsheet  + fetal movement, no contractions, no bleeding, no loss of fluid   Discussed monitoring fetal movement     1) concerns: none   Covid-19 concerns: none   2) Routine: O+ RI,   3) Risk factors: none  4) Return: 2 weeks     [facility]

## 2020-06-24 NOTE — PATIENT INSTRUCTIONS
"Return 2 weeks   Return to clinic:  every 4 weeks till 28 weeks, then every 2 weeks till 36 weeks, then weekly till delivery      Phone numbers [place]:  Day/ night [phone] ask for ob triage  Emergency:  Call labor and delivery:  [phone]    What should I call about??    Contraction every 5 minutes for 1 hour 1 minute long (511), bleeding, loss of fluid, headache that doesn't resolve with tylenol, and decreased fetal movement     Start kick counts @ 26-28 weeks   There is an cari for this!  It is called \"count the kicks\"  Keep track of movement and discover your normal baby movement pattern   guideline is listed below  Please call if you do not feel the baby move!  We will have you come in for fetal heart rate monitoring:   Perception of at least 10 FMs during 12 hours of normal maternal activity   Perception of least 10 FMs over two hours when the mother is at rest and focused on [facility] Address   [address], [place], [place] [address] [phone]    Dr. Yue Beck, DO    OB/GYN   [facility]                                                      "

## 2020-07-10 ENCOUNTER — PRENATAL OFFICE VISIT (OUTPATIENT)
Dept: OBGYN | Facility: CLINIC | Age: 30
End: 2020-07-10
Payer: COMMERCIAL

## 2020-07-10 VITALS — BODY MASS INDEX: 30.29 KG/M2 | SYSTOLIC BLOOD PRESSURE: 104 MMHG | WEIGHT: 171 LBS | DIASTOLIC BLOOD PRESSURE: 60 MMHG

## 2020-07-10 DIAGNOSIS — Z78.9 LANGUAGE BARRIER TO COMMUNICATION: ICD-10-CM

## 2020-07-10 DIAGNOSIS — Z34.93 PRENATAL CARE IN THIRD TRIMESTER: Primary | ICD-10-CM

## 2020-07-10 PROCEDURE — T1013 SIGN LANG/ORAL INTERPRETER: HCPCS | Mod: U3 | Performed by: OBSTETRICS & GYNECOLOGY

## 2020-07-10 PROCEDURE — 99207 ZZC PRENATAL VISIT: CPT | Performed by: OBSTETRICS & GYNECOLOGY

## 2020-07-10 NOTE — NURSING NOTE
"Chief Complaint   Patient presents with     Prenatal Care   32w5d  C/o low back pain into mid abd x 2 days  Baby active    initial /60   Wt 77.6 kg (171 lb)   BMI 30.29 kg/m   Estimated body mass index is 30.29 kg/m  as calculated from the following:    Height as of 5/26/20: 1.6 m (5' 3\").    Weight as of this encounter: 77.6 kg (171 lb).  BP completed using cuff size regular.  Scarlett Cuevas CMA    "

## 2020-08-05 ENCOUNTER — PRENATAL OFFICE VISIT (OUTPATIENT)
Dept: OBGYN | Facility: CLINIC | Age: 30
End: 2020-08-05
Payer: COMMERCIAL

## 2020-08-05 VITALS — SYSTOLIC BLOOD PRESSURE: 98 MMHG | WEIGHT: 176 LBS | DIASTOLIC BLOOD PRESSURE: 60 MMHG | BODY MASS INDEX: 31.18 KG/M2

## 2020-08-05 DIAGNOSIS — Z34.93 PRENATAL CARE IN THIRD TRIMESTER: Primary | ICD-10-CM

## 2020-08-05 DIAGNOSIS — Z78.9 LANGUAGE BARRIER TO COMMUNICATION: ICD-10-CM

## 2020-08-05 PROCEDURE — 87653 STREP B DNA AMP PROBE: CPT | Performed by: OBSTETRICS & GYNECOLOGY

## 2020-08-05 PROCEDURE — 99207 ZZC PRENATAL VISIT: CPT | Performed by: OBSTETRICS & GYNECOLOGY

## 2020-08-05 PROCEDURE — 87186 SC STD MICRODIL/AGAR DIL: CPT | Performed by: OBSTETRICS & GYNECOLOGY

## 2020-08-05 NOTE — PROGRESS NOTES
by phone - Kyrgyz speaking only    No concerns.  GBS done today.  Baby active.  RTC 1 week.  L&D discussed.

## 2020-08-05 NOTE — NURSING NOTE
"Chief Complaint   Patient presents with     Prenatal Care     36 3/7 WEEKS       Initial BP 98/60   Wt 79.8 kg (176 lb)   BMI 31.18 kg/m   Estimated body mass index is 31.18 kg/m  as calculated from the following:    Height as of 20: 1.6 m (5' 3\").    Weight as of this encounter: 79.8 kg (176 lb).  BP completed using cuff size: regular    Questioned patient about current smoking habits.  Pt. has never smoked.          The following HM Due: NONE  +fetal movement  +swelling    Frances Barkley, CMA    "

## 2020-08-06 LAB
GP B STREP DNA SPEC QL NAA+PROBE: POSITIVE
SPECIMEN SOURCE: ABNORMAL

## 2020-08-10 LAB
BACTERIA SPEC CULT: ABNORMAL
SPECIMEN SOURCE: ABNORMAL

## 2020-08-12 ENCOUNTER — PRENATAL OFFICE VISIT (OUTPATIENT)
Dept: OBGYN | Facility: CLINIC | Age: 30
End: 2020-08-12
Payer: COMMERCIAL

## 2020-08-12 VITALS — DIASTOLIC BLOOD PRESSURE: 62 MMHG | BODY MASS INDEX: 31.81 KG/M2 | SYSTOLIC BLOOD PRESSURE: 108 MMHG | WEIGHT: 179.6 LBS

## 2020-08-12 DIAGNOSIS — O99.820 GBS (GROUP B STREPTOCOCCUS CARRIER), +RV CULTURE, CURRENTLY PREGNANT: ICD-10-CM

## 2020-08-12 DIAGNOSIS — O09.33 INSUFFICIENT PRENATAL CARE IN THIRD TRIMESTER: Primary | ICD-10-CM

## 2020-08-12 PROCEDURE — 99207 ZZC COMPLICATED OB VISIT: CPT | Performed by: OBSTETRICS & GYNECOLOGY

## 2020-08-12 NOTE — NURSING NOTE
"Chief Complaint   Patient presents with     Prenatal Care     37 weeks 3 days, No questions or concerns, No c/o leaking of fluids, vaginal bleeding, cramping/contractions. Feeling fetal movement daily       Initial /62   Wt 81.5 kg (179 lb 9.6 oz)   BMI 31.81 kg/m   Estimated body mass index is 31.81 kg/m  as calculated from the following:    Height as of 20: 1.6 m (5' 3\").    Weight as of this encounter: 81.5 kg (179 lb 9.6 oz).  BP completed using cuff size: regular    Questioned patient about current smoking habits.  Pt. has never smoked.          The following HM Due: NONE    Tasha Duggan CMA               "

## 2020-08-12 NOTE — PROGRESS NOTES
I communicated with Pt via Slovak  because Pt speaks no/limited English.    No c/o's.  Fetal movement counts BID, rupture of membranes/labor precautions reviewed.  RTC 1 week(s).      Encounter Diagnoses   Name Primary?     Insufficient prenatal care in third trimester      GBS (group B Streptococcus carrier), +RV culture, currently pregnant        Risk factors listed above are stable and being addressed as noted.    Alex Rosen MD  [facility]

## 2020-08-17 ENCOUNTER — PRENATAL OFFICE VISIT (OUTPATIENT)
Dept: OBGYN | Facility: CLINIC | Age: 30
End: 2020-08-17
Payer: COMMERCIAL

## 2020-08-17 VITALS
HEART RATE: 97 BPM | SYSTOLIC BLOOD PRESSURE: 110 MMHG | DIASTOLIC BLOOD PRESSURE: 68 MMHG | WEIGHT: 178.8 LBS | BODY MASS INDEX: 31.67 KG/M2

## 2020-08-17 DIAGNOSIS — Z34.93 PRENATAL CARE IN THIRD TRIMESTER: Primary | ICD-10-CM

## 2020-08-17 PROCEDURE — 99207 ZZC PRENATAL VISIT: CPT | Performed by: OBSTETRICS & GYNECOLOGY

## 2020-08-17 NOTE — PROGRESS NOTES
Doing well.   She inquires if she needs to be admitted on her due date for labor. Although, she desires spontaneous labor to happen and declines induction of labor.   Denies VB, ctx, LOF. +FM  /68   Pulse 97   Wt 81.1 kg (178 lb 12.8 oz)   BMI 31.67 kg/m    General Appearance: NAD  Abdomen: Gravid, NT  Refer to flow sheet above. Patient defers cervix check today.   A/P: 30 year old  at 38w1d  -- clarified to patient that admission for spontaneous labor on her due date is not feasible since she may be in the hospital for a long time before spontaneous labor occurs unless she wants to be induced, which she declined; also emphasized that if spontaneous labor does not occur by 41w, then she will be induced (this concept required a lot of clarification via Divehi interpretor, but in the end she was able to understand)   -- labor precautions reviewed  RTC in 1 week    Keisha Proctor MD  [facility]

## 2020-08-17 NOTE — NURSING NOTE
"Chief Complaint   Patient presents with     Prenatal Care     38 weeks 1 day. No c/o vaginal bleeding, leaking of fluids, contractions. Feeling fetal movement daily. Patient would like cervix checked today       Initial /68   Pulse 97   Wt 81.1 kg (178 lb 12.8 oz)   BMI 31.67 kg/m   Estimated body mass index is 31.67 kg/m  as calculated from the following:    Height as of 20: 1.6 m (5' 3\").    Weight as of this encounter: 81.1 kg (178 lb 12.8 oz).  BP completed using cuff size: regular    Questioned patient about current smoking habits.  Pt. has never smoked.          The following HM Due: NONE      Tasha Duggan CMA               "

## 2020-08-26 ENCOUNTER — PRENATAL OFFICE VISIT (OUTPATIENT)
Dept: OBGYN | Facility: CLINIC | Age: 30
End: 2020-08-26
Payer: COMMERCIAL

## 2020-08-26 VITALS — WEIGHT: 181.4 LBS | SYSTOLIC BLOOD PRESSURE: 100 MMHG | DIASTOLIC BLOOD PRESSURE: 64 MMHG | BODY MASS INDEX: 32.13 KG/M2

## 2020-08-26 DIAGNOSIS — Z34.93 PRENATAL CARE IN THIRD TRIMESTER: Primary | ICD-10-CM

## 2020-08-26 DIAGNOSIS — O99.820 GBS (GROUP B STREPTOCOCCUS CARRIER), +RV CULTURE, CURRENTLY PREGNANT: ICD-10-CM

## 2020-08-26 DIAGNOSIS — Z78.9 LANGUAGE BARRIER TO COMMUNICATION: ICD-10-CM

## 2020-08-26 PROCEDURE — 99207 ZZC PRENATAL VISIT: CPT | Performed by: OBSTETRICS & GYNECOLOGY

## 2020-08-26 NOTE — NURSING NOTE
"Chief Complaint   Patient presents with     Prenatal Care   39w3d  No concerns  Baby active    initial /64   Wt 82.3 kg (181 lb 6.4 oz)   BMI 32.13 kg/m   Estimated body mass index is 32.13 kg/m  as calculated from the following:    Height as of 5/26/20: 1.6 m (5' 3\").    Weight as of this encounter: 82.3 kg (181 lb 6.4 oz).  BP completed using cuff size regular.  Scarlett Cuevas CMA    "

## 2020-08-26 NOTE — PROGRESS NOTES
Yakut  available by phone    Patient w/o complaint.    L&D discussed.  Baby active.  Cervix 1.5/60/-3 with increased pressure and cramps.  Labored on due date with last.    RTC 1 week if undelivered

## 2020-08-31 ENCOUNTER — HOSPITAL ENCOUNTER (OUTPATIENT)
Facility: CLINIC | Age: 30
Discharge: HOME | End: 2020-08-31
Attending: OBSTETRICS & GYNECOLOGY | Admitting: OBSTETRICS & GYNECOLOGY
Payer: COMMERCIAL

## 2020-08-31 VITALS — RESPIRATION RATE: 16 BRPM | DIASTOLIC BLOOD PRESSURE: 71 MMHG | SYSTOLIC BLOOD PRESSURE: 113 MMHG | TEMPERATURE: 98.6 F

## 2020-08-31 PROBLEM — Z36.89 ENCOUNTER FOR TRIAGE IN PREGNANT PATIENT: Status: ACTIVE | Noted: 2020-08-31

## 2020-08-31 LAB
ALBUMIN UR-MCNC: NEGATIVE MG/DL
APPEARANCE UR: CLEAR
BILIRUB UR QL STRIP: NEGATIVE
COLOR UR AUTO: ABNORMAL
GLUCOSE UR STRIP-MCNC: NEGATIVE MG/DL
HGB UR QL STRIP: NEGATIVE
KETONES UR STRIP-MCNC: NEGATIVE MG/DL
LEUKOCYTE ESTERASE UR QL STRIP: NEGATIVE
MUCOUS THREADS #/AREA URNS LPF: PRESENT /LPF
NITRATE UR QL: NEGATIVE
PH UR STRIP: 7.5 PH (ref 5–7)
RBC #/AREA URNS AUTO: 1 /HPF (ref 0–2)
SOURCE: ABNORMAL
SP GR UR STRIP: 1.01 (ref 1–1.03)
SQUAMOUS #/AREA URNS AUTO: 4 /HPF (ref 0–1)
UROBILINOGEN UR STRIP-MCNC: NORMAL MG/DL (ref 0–2)
WBC #/AREA URNS AUTO: 1 /HPF (ref 0–5)

## 2020-08-31 PROCEDURE — 87086 URINE CULTURE/COLONY COUNT: CPT | Performed by: OBSTETRICS & GYNECOLOGY

## 2020-08-31 PROCEDURE — 81001 URINALYSIS AUTO W/SCOPE: CPT | Performed by: OBSTETRICS & GYNECOLOGY

## 2020-08-31 PROCEDURE — G0463 HOSPITAL OUTPT CLINIC VISIT: HCPCS

## 2020-08-31 NOTE — PLAN OF CARE
Data: Patient presented to [facility]: 2020 11:29 AM.  Reason for maternal/fetal assessment is abdominal and back pain. Patient reports lower, intermittent abdominal and back pain that has been happening for the past few weeks but is worse today. Patient also states she has lower abdominal pain with urination. Patient is a .  Prenatal record reviewed. Pregnancy has been uncomplicated.  Gestational Age 40w1d. VSS. Fetal movement present. Patient denies leaking of vaginal fluid/rupture of membranes, pelvic pressure, nausea, vomiting, headache, visual disturbances, epigastric or URQ pain, significant edema. Support person is present.   Action: Verbal consent for EFM. Triage assessment completed. Bill of rights reviewed.  Response: Patient verbalized agreement with plan. Will contact Dr Keisha Proctor with update and further orders.    Admission completed via  Ipad ID [id number].

## 2020-08-31 NOTE — PROVIDER NOTIFICATION
08/31/20 1325   Provider Notification   Provider Name/Title Dr. Proctor   Method of Notification Phone   Request Evaluate - Remote   Notification Reason Status Update;Lab/Diagnostic Study   MD updated: no change in SVE, linda every 5-12 minutes, FHT's category one since last phone call. Reviewed UA. Orders to send urine to culture and discharge patient. Patient has next prenatal appointment on Wednesday.

## 2020-08-31 NOTE — PROVIDER NOTIFICATION
08/31/20 1220   Provider Notification   Provider Name/Title Dr. Proctor   Method of Notification Phone   Request Evaluate - Remote   Notification Reason Patient Arrived;SVE;Status Update   MD updated on patient arrival, c/o of abdominal and back pain that is worse today and lower abdominal pain with urination. SVE 1.5/40/-3, unchanged from clinic on 8/26. [place] picking rare contraction, abdomen soft to palpation. FHT's originally category one with accelerations but a variable and late deceleration just noted. MD states to send UA and recheck SVE in an hour, will update him at that time.

## 2020-08-31 NOTE — PLAN OF CARE
Data: Patient assessed in the [facility] for abdominal and back pain.  Cervical exam 1.5/40/-3.  Membranes intact.  Contractions every 5-12 minutes.  Action:  Presumed adequate fetal oxygenation documented (see flow record). Discharge instructions reviewed via  Ipad ID #[id number].  Patient instructed to report change in fetal movement, vaginal leaking of fluid or bleeding, abdominal pain, or any concerns related to the pregnancy to her nurse/physician.    Response: Orders to discharge home per Keisha Proctor.  Patient verbalized understanding of education and verbalized agreement with plan. Discharged to home at 1345.

## 2020-08-31 NOTE — DISCHARGE INSTRUCTIONS
Discharge Instruction for Undelivered Patients      You were seen for: Abdominal and Back pain  We Consulted: Dr. Proctor  You had (Test or Medicine): uterine and fetal monitoring, urine analysis     Diet:   Drink 8 to 12 glasses of liquids (milk, juice, water) every day.  You may eat meals and snacks.     Activity:  Call your doctor or nurse midwife if your baby is moving less than usual.     Call your provider if you notice:  Swelling in your face or increased swelling in your hands or legs.  Headaches that are not relieved by Tylenol (acetaminophen).  Changes in your vision (blurring: seeing spots or stars.)  Nausea (sick to your stomach) and vomiting (throwing up).   Weight gain of 5 pounds or more per week.  Heartburn that doesn't go away.  Signs of bladder infection: pain when you urinate (use the toilet), need to go more often and more urgently.  The bag of grace (rupture of membranes) breaks, or you notice leaking in your underwear.  Bright red blood in your underwear.  Abdominal (lower belly) or stomach pain.  For first baby: Contractions (tightening) less than 5 minutes apart for one hour or more.  Second (plus) baby: Contractions (tightening) less than 10 minutes apart and getting stronger.  *If less than 34 weeks: Contractions (tightenings) more than 6 times in one hour.  Increase or change in vaginal discharge (note the color and amount)    Follow-up:  As scheduled in the clinic

## 2020-09-01 LAB
BACTERIA SPEC CULT: NORMAL
Lab: NORMAL
SPECIMEN SOURCE: NORMAL

## 2020-09-02 ENCOUNTER — TELEPHONE (OUTPATIENT)
Dept: OBGYN | Facility: CLINIC | Age: 30
End: 2020-09-02

## 2020-09-02 ENCOUNTER — PRENATAL OFFICE VISIT (OUTPATIENT)
Dept: OBGYN | Facility: CLINIC | Age: 30
End: 2020-09-02
Payer: COMMERCIAL

## 2020-09-02 VITALS — BODY MASS INDEX: 32.2 KG/M2 | SYSTOLIC BLOOD PRESSURE: 98 MMHG | DIASTOLIC BLOOD PRESSURE: 60 MMHG | WEIGHT: 181.8 LBS

## 2020-09-02 DIAGNOSIS — Z34.93 PRENATAL CARE IN THIRD TRIMESTER: Primary | ICD-10-CM

## 2020-09-02 DIAGNOSIS — O99.820 GBS (GROUP B STREPTOCOCCUS CARRIER), +RV CULTURE, CURRENTLY PREGNANT: ICD-10-CM

## 2020-09-02 DIAGNOSIS — O09.33 INSUFFICIENT PRENATAL CARE IN THIRD TRIMESTER: ICD-10-CM

## 2020-09-02 DIAGNOSIS — Z78.9 LANGUAGE BARRIER TO COMMUNICATION: ICD-10-CM

## 2020-09-02 DIAGNOSIS — Z34.93 PRENATAL CARE IN THIRD TRIMESTER: ICD-10-CM

## 2020-09-02 PROCEDURE — 59425 ANTEPARTUM CARE ONLY: CPT | Performed by: OBSTETRICS & GYNECOLOGY

## 2020-09-02 PROCEDURE — U0003 INFECTIOUS AGENT DETECTION BY NUCLEIC ACID (DNA OR RNA); SEVERE ACUTE RESPIRATORY SYNDROME CORONAVIRUS 2 (SARS-COV-2) (CORONAVIRUS DISEASE [COVID-19]), AMPLIFIED PROBE TECHNIQUE, MAKING USE OF HIGH THROUGHPUT TECHNOLOGIES AS DESCRIBED BY CMS-2020-01-R: HCPCS | Performed by: OBSTETRICS & GYNECOLOGY

## 2020-09-02 PROCEDURE — 99207 ZZC PRENATAL VISIT: CPT | Performed by: OBSTETRICS & GYNECOLOGY

## 2020-09-02 NOTE — PROGRESS NOTES
Slovak  available by phone and also spoke with bilingual  by phone to insure understanding of logistics of induction    31yo  at 40w3d.  Seen on L&D for pain and ctx 2 days ago.  No cervical change.    Discussed postdates concerns and management.  Will schedule induction  if undelivered.  COVID test ordered stat.

## 2020-09-02 NOTE — TELEPHONE ENCOUNTER
Induction smart phrase   Procedure: Induction  Date: 9/06/2020  Time: 7:30am  Hospital: [facility]  Orders faxed and the patient was advised to call [facility] [phone] labor and delivery department one hour prior to arrival.  Added to outlook  Scarlett Cuevas CMA

## 2020-09-02 NOTE — NURSING NOTE
"Chief Complaint   Patient presents with     Prenatal Care   40w3d  Discuss induction    initial BP 98/60   Wt 82.5 kg (181 lb 12.8 oz)   BMI 32.20 kg/m   Estimated body mass index is 32.2 kg/m  as calculated from the following:    Height as of 5/26/20: 1.6 m (5' 3\").    Weight as of this encounter: 82.5 kg (181 lb 12.8 oz).  BP completed using cuff size regular.  Scarlett Cuevas CMA    "

## 2020-09-03 ENCOUNTER — ANESTHESIA (OUTPATIENT)
Dept: OBGYN | Facility: CLINIC | Age: 30
End: 2020-09-03

## 2020-09-03 ENCOUNTER — HOSPITAL ENCOUNTER (INPATIENT)
Facility: CLINIC | Age: 30
LOS: 2 days | Discharge: HOME HEALTH | End: 2020-09-05
Attending: OBSTETRICS & GYNECOLOGY | Admitting: OBSTETRICS & GYNECOLOGY
Payer: COMMERCIAL

## 2020-09-03 ENCOUNTER — ANESTHESIA EVENT (OUTPATIENT)
Dept: OBGYN | Facility: CLINIC | Age: 30
End: 2020-09-03

## 2020-09-03 DIAGNOSIS — Z37.9 VACUUM-ASSISTED VAGINAL DELIVERY: Primary | ICD-10-CM

## 2020-09-03 LAB
RUPTURE OF FETAL MEMBRANES BY ROM PLUS: POSITIVE
SARS-COV-2 RNA SPEC QL NAA+PROBE: NOT DETECTED
SPECIMEN SOURCE: NORMAL

## 2020-09-03 PROCEDURE — 36415 COLL VENOUS BLD VENIPUNCTURE: CPT | Performed by: OBSTETRICS & GYNECOLOGY

## 2020-09-03 PROCEDURE — 37000011 ZZH ANESTHESIA WARD SERVICE

## 2020-09-03 PROCEDURE — 84112 EVAL AMNIOTIC FLUID PROTEIN: CPT | Performed by: OBSTETRICS & GYNECOLOGY

## 2020-09-03 PROCEDURE — 85018 HEMOGLOBIN: CPT | Performed by: OBSTETRICS & GYNECOLOGY

## 2020-09-03 PROCEDURE — 86850 RBC ANTIBODY SCREEN: CPT | Performed by: OBSTETRICS & GYNECOLOGY

## 2020-09-03 PROCEDURE — 25000128 H RX IP 250 OP 636: Performed by: OBSTETRICS & GYNECOLOGY

## 2020-09-03 PROCEDURE — 86900 BLOOD TYPING SEROLOGIC ABO: CPT | Performed by: OBSTETRICS & GYNECOLOGY

## 2020-09-03 PROCEDURE — 86901 BLOOD TYPING SEROLOGIC RH(D): CPT | Performed by: OBSTETRICS & GYNECOLOGY

## 2020-09-03 PROCEDURE — 25800030 ZZH RX IP 258 OP 636: Performed by: OBSTETRICS & GYNECOLOGY

## 2020-09-03 PROCEDURE — 12000000 ZZH R&B MED SURG/OB

## 2020-09-03 PROCEDURE — 0KQM0ZZ REPAIR PERINEUM MUSCLE, OPEN APPROACH: ICD-10-PCS | Performed by: OBSTETRICS & GYNECOLOGY

## 2020-09-03 PROCEDURE — G0463 HOSPITAL OUTPT CLINIC VISIT: HCPCS

## 2020-09-03 RX ORDER — OXYTOCIN 10 [USP'U]/ML
10 INJECTION, SOLUTION INTRAMUSCULAR; INTRAVENOUS
Status: DISCONTINUED | OUTPATIENT
Start: 2020-09-03 | End: 2020-09-04

## 2020-09-03 RX ORDER — ONDANSETRON 2 MG/ML
4 INJECTION INTRAMUSCULAR; INTRAVENOUS EVERY 6 HOURS PRN
Status: DISCONTINUED | OUTPATIENT
Start: 2020-09-03 | End: 2020-09-04

## 2020-09-03 RX ORDER — IBUPROFEN 800 MG/1
800 TABLET, FILM COATED ORAL
Status: COMPLETED | OUTPATIENT
Start: 2020-09-03 | End: 2020-09-04

## 2020-09-03 RX ORDER — METHYLERGONOVINE MALEATE 0.2 MG/ML
200 INJECTION INTRAVENOUS
Status: DISCONTINUED | OUTPATIENT
Start: 2020-09-03 | End: 2020-09-04

## 2020-09-03 RX ORDER — ONDANSETRON 2 MG/ML
4 INJECTION INTRAMUSCULAR; INTRAVENOUS EVERY 6 HOURS PRN
Status: DISCONTINUED | OUTPATIENT
Start: 2020-09-03 | End: 2020-09-05 | Stop reason: HOSPADM

## 2020-09-03 RX ORDER — EPHEDRINE SULFATE 50 MG/ML
5 INJECTION, SOLUTION INTRAMUSCULAR; INTRAVENOUS; SUBCUTANEOUS
Status: DISCONTINUED | OUTPATIENT
Start: 2020-09-03 | End: 2020-09-05 | Stop reason: HOSPADM

## 2020-09-03 RX ORDER — OXYTOCIN/0.9 % SODIUM CHLORIDE 30/500 ML
1-24 PLASTIC BAG, INJECTION (ML) INTRAVENOUS CONTINUOUS
Status: DISCONTINUED | OUTPATIENT
Start: 2020-09-03 | End: 2020-09-04

## 2020-09-03 RX ORDER — CARBOPROST TROMETHAMINE 250 UG/ML
250 INJECTION, SOLUTION INTRAMUSCULAR
Status: DISCONTINUED | OUTPATIENT
Start: 2020-09-03 | End: 2020-09-04

## 2020-09-03 RX ORDER — NALOXONE HYDROCHLORIDE 0.4 MG/ML
.1-.4 INJECTION, SOLUTION INTRAMUSCULAR; INTRAVENOUS; SUBCUTANEOUS
Status: DISCONTINUED | OUTPATIENT
Start: 2020-09-03 | End: 2020-09-04

## 2020-09-03 RX ORDER — LIDOCAINE 40 MG/G
CREAM TOPICAL
Status: DISCONTINUED | OUTPATIENT
Start: 2020-09-03 | End: 2020-09-04

## 2020-09-03 RX ORDER — NALBUPHINE HYDROCHLORIDE 20 MG/ML
2.5-5 INJECTION, SOLUTION INTRAMUSCULAR; INTRAVENOUS; SUBCUTANEOUS EVERY 6 HOURS PRN
Status: DISCONTINUED | OUTPATIENT
Start: 2020-09-03 | End: 2020-09-05 | Stop reason: HOSPADM

## 2020-09-03 RX ORDER — OXYCODONE AND ACETAMINOPHEN 5; 325 MG/1; MG/1
1 TABLET ORAL
Status: DISCONTINUED | OUTPATIENT
Start: 2020-09-03 | End: 2020-09-04

## 2020-09-03 RX ORDER — ONDANSETRON 4 MG/1
4 TABLET, ORALLY DISINTEGRATING ORAL EVERY 6 HOURS PRN
Status: DISCONTINUED | OUTPATIENT
Start: 2020-09-03 | End: 2020-09-05 | Stop reason: HOSPADM

## 2020-09-03 RX ORDER — ACETAMINOPHEN 325 MG/1
650 TABLET ORAL EVERY 4 HOURS PRN
Status: DISCONTINUED | OUTPATIENT
Start: 2020-09-03 | End: 2020-09-04

## 2020-09-03 RX ORDER — OXYTOCIN/0.9 % SODIUM CHLORIDE 30/500 ML
100-340 PLASTIC BAG, INJECTION (ML) INTRAVENOUS CONTINUOUS PRN
Status: COMPLETED | OUTPATIENT
Start: 2020-09-03 | End: 2020-09-04

## 2020-09-03 RX ORDER — PENICILLIN G POTASSIUM 5000000 [IU]/1
5 INJECTION, POWDER, FOR SOLUTION INTRAMUSCULAR; INTRAVENOUS ONCE
Status: COMPLETED | OUTPATIENT
Start: 2020-09-03 | End: 2020-09-04

## 2020-09-03 RX ORDER — NALOXONE HYDROCHLORIDE 0.4 MG/ML
.1-.4 INJECTION, SOLUTION INTRAMUSCULAR; INTRAVENOUS; SUBCUTANEOUS
Status: DISCONTINUED | OUTPATIENT
Start: 2020-09-03 | End: 2020-09-05 | Stop reason: HOSPADM

## 2020-09-03 RX ORDER — TRANEXAMIC ACID 10 MG/ML
1 INJECTION, SOLUTION INTRAVENOUS EVERY 30 MIN PRN
Status: DISCONTINUED | OUTPATIENT
Start: 2020-09-03 | End: 2020-09-04

## 2020-09-03 RX ORDER — SODIUM CHLORIDE, SODIUM LACTATE, POTASSIUM CHLORIDE, CALCIUM CHLORIDE 600; 310; 30; 20 MG/100ML; MG/100ML; MG/100ML; MG/100ML
INJECTION, SOLUTION INTRAVENOUS CONTINUOUS
Status: DISCONTINUED | OUTPATIENT
Start: 2020-09-03 | End: 2020-09-04

## 2020-09-03 RX ORDER — FENTANYL CITRATE 50 UG/ML
50-100 INJECTION, SOLUTION INTRAMUSCULAR; INTRAVENOUS
Status: DISCONTINUED | OUTPATIENT
Start: 2020-09-03 | End: 2020-09-04

## 2020-09-03 RX ADMIN — SODIUM CHLORIDE, POTASSIUM CHLORIDE, SODIUM LACTATE AND CALCIUM CHLORIDE: 600; 310; 30; 20 INJECTION, SOLUTION INTRAVENOUS at 23:54

## 2020-09-03 RX ADMIN — PENICILLIN G POTASSIUM 5 MILLION UNITS: 5000000 POWDER, FOR SOLUTION INTRAMUSCULAR; INTRAPLEURAL; INTRATHECAL; INTRAVENOUS at 23:55

## 2020-09-04 PROBLEM — Z37.9 VACUUM-ASSISTED VAGINAL DELIVERY: Status: ACTIVE | Noted: 2020-09-04

## 2020-09-04 LAB
ABO + RH BLD: NORMAL
ABO + RH BLD: NORMAL
AMPHETAMINES UR QL SCN: NEGATIVE
BLD GP AB SCN SERPL QL: NORMAL
BLOOD BANK CMNT PATIENT-IMP: NORMAL
CANNABINOIDS UR QL: NEGATIVE
COCAINE UR QL: NEGATIVE
HGB BLD-MCNC: 11.9 G/DL (ref 11.7–15.7)
OPIATES UR QL SCN: NEGATIVE
PCP UR QL SCN: NEGATIVE
SPECIMEN EXP DATE BLD: NORMAL
T PALLIDUM AB SER QL: NONREACTIVE

## 2020-09-04 PROCEDURE — 25000125 ZZHC RX 250: Performed by: OBSTETRICS & GYNECOLOGY

## 2020-09-04 PROCEDURE — 25800030 ZZH RX IP 258 OP 636: Performed by: OBSTETRICS & GYNECOLOGY

## 2020-09-04 PROCEDURE — 25000132 ZZH RX MED GY IP 250 OP 250 PS 637: Performed by: OBSTETRICS & GYNECOLOGY

## 2020-09-04 PROCEDURE — 86780 TREPONEMA PALLIDUM: CPT | Performed by: OBSTETRICS & GYNECOLOGY

## 2020-09-04 PROCEDURE — 80307 DRUG TEST PRSMV CHEM ANLYZR: CPT | Performed by: OBSTETRICS & GYNECOLOGY

## 2020-09-04 PROCEDURE — 72200001 ZZH LABOR CARE VAGINAL DELIVERY SINGLE

## 2020-09-04 PROCEDURE — 12000000 ZZH R&B MED SURG/OB

## 2020-09-04 PROCEDURE — 25000128 H RX IP 250 OP 636: Performed by: OBSTETRICS & GYNECOLOGY

## 2020-09-04 PROCEDURE — 59410 OBSTETRICAL CARE: CPT | Performed by: OBSTETRICS & GYNECOLOGY

## 2020-09-04 RX ORDER — MODIFIED LANOLIN
OINTMENT (GRAM) TOPICAL
Status: DISCONTINUED | OUTPATIENT
Start: 2020-09-04 | End: 2020-09-05 | Stop reason: HOSPADM

## 2020-09-04 RX ORDER — OXYTOCIN/0.9 % SODIUM CHLORIDE 30/500 ML
340 PLASTIC BAG, INJECTION (ML) INTRAVENOUS CONTINUOUS PRN
Status: DISCONTINUED | OUTPATIENT
Start: 2020-09-04 | End: 2020-09-05 | Stop reason: HOSPADM

## 2020-09-04 RX ORDER — PRENATAL VIT/IRON FUM/FOLIC AC 27MG-0.8MG
1 TABLET ORAL DAILY
Status: DISCONTINUED | OUTPATIENT
Start: 2020-09-04 | End: 2020-09-05 | Stop reason: HOSPADM

## 2020-09-04 RX ORDER — OXYTOCIN/0.9 % SODIUM CHLORIDE 30/500 ML
100 PLASTIC BAG, INJECTION (ML) INTRAVENOUS CONTINUOUS
Status: DISCONTINUED | OUTPATIENT
Start: 2020-09-04 | End: 2020-09-05 | Stop reason: HOSPADM

## 2020-09-04 RX ORDER — NALOXONE HYDROCHLORIDE 0.4 MG/ML
.1-.4 INJECTION, SOLUTION INTRAMUSCULAR; INTRAVENOUS; SUBCUTANEOUS
Status: DISCONTINUED | OUTPATIENT
Start: 2020-09-04 | End: 2020-09-05 | Stop reason: HOSPADM

## 2020-09-04 RX ORDER — TRANEXAMIC ACID 10 MG/ML
1 INJECTION, SOLUTION INTRAVENOUS EVERY 30 MIN PRN
Status: DISCONTINUED | OUTPATIENT
Start: 2020-09-04 | End: 2020-09-05 | Stop reason: HOSPADM

## 2020-09-04 RX ORDER — IBUPROFEN 800 MG/1
800 TABLET, FILM COATED ORAL EVERY 6 HOURS PRN
Status: DISCONTINUED | OUTPATIENT
Start: 2020-09-04 | End: 2020-09-05 | Stop reason: HOSPADM

## 2020-09-04 RX ORDER — HYDROCORTISONE 2.5 %
CREAM (GRAM) TOPICAL 3 TIMES DAILY PRN
Status: DISCONTINUED | OUTPATIENT
Start: 2020-09-04 | End: 2020-09-05 | Stop reason: HOSPADM

## 2020-09-04 RX ORDER — AMOXICILLIN 250 MG
1 CAPSULE ORAL 2 TIMES DAILY
Status: DISCONTINUED | OUTPATIENT
Start: 2020-09-04 | End: 2020-09-05 | Stop reason: HOSPADM

## 2020-09-04 RX ORDER — AMOXICILLIN 250 MG
2 CAPSULE ORAL 2 TIMES DAILY
Status: DISCONTINUED | OUTPATIENT
Start: 2020-09-04 | End: 2020-09-05 | Stop reason: HOSPADM

## 2020-09-04 RX ORDER — OXYTOCIN 10 [USP'U]/ML
10 INJECTION, SOLUTION INTRAMUSCULAR; INTRAVENOUS
Status: DISCONTINUED | OUTPATIENT
Start: 2020-09-04 | End: 2020-09-05 | Stop reason: HOSPADM

## 2020-09-04 RX ORDER — ACETAMINOPHEN 325 MG/1
650 TABLET ORAL EVERY 4 HOURS PRN
Status: DISCONTINUED | OUTPATIENT
Start: 2020-09-04 | End: 2020-09-05 | Stop reason: HOSPADM

## 2020-09-04 RX ORDER — BISACODYL 10 MG
10 SUPPOSITORY, RECTAL RECTAL DAILY PRN
Status: DISCONTINUED | OUTPATIENT
Start: 2020-09-06 | End: 2020-09-05 | Stop reason: HOSPADM

## 2020-09-04 RX ADMIN — ACETAMINOPHEN 650 MG: 325 TABLET, FILM COATED ORAL at 12:49

## 2020-09-04 RX ADMIN — FENTANYL CITRATE 100 MCG: 50 INJECTION, SOLUTION INTRAMUSCULAR; INTRAVENOUS at 05:05

## 2020-09-04 RX ADMIN — SODIUM CHLORIDE 2.5 MILLION UNITS: 9 INJECTION, SOLUTION INTRAVENOUS at 08:02

## 2020-09-04 RX ADMIN — FENTANYL CITRATE 100 MCG: 50 INJECTION, SOLUTION INTRAMUSCULAR; INTRAVENOUS at 03:02

## 2020-09-04 RX ADMIN — TRANEXAMIC ACID 1 G: 10 INJECTION, SOLUTION INTRAVENOUS at 08:43

## 2020-09-04 RX ADMIN — LIDOCAINE HYDROCHLORIDE 20 ML: 10 INJECTION, SOLUTION EPIDURAL; INFILTRATION; INTRACAUDAL; PERINEURAL at 08:44

## 2020-09-04 RX ADMIN — ACETAMINOPHEN 650 MG: 325 TABLET, FILM COATED ORAL at 17:58

## 2020-09-04 RX ADMIN — DOCUSATE SODIUM 50 MG AND SENNOSIDES 8.6 MG 1 TABLET: 8.6; 5 TABLET, FILM COATED ORAL at 20:45

## 2020-09-04 RX ADMIN — Medication 2 MILLI-UNITS/MIN: at 01:24

## 2020-09-04 RX ADMIN — Medication 340 ML/HR: at 09:00

## 2020-09-04 RX ADMIN — IBUPROFEN 800 MG: 800 TABLET ORAL at 16:09

## 2020-09-04 RX ADMIN — SODIUM CHLORIDE 2.5 MILLION UNITS: 9 INJECTION, SOLUTION INTRAVENOUS at 03:59

## 2020-09-04 RX ADMIN — IBUPROFEN 800 MG: 800 TABLET, FILM COATED ORAL at 09:15

## 2020-09-04 NOTE — PROVIDER NOTIFICATION
09/04/20 0730   Provider Notification   Provider Name/Title Dr. Valdes   Method of Notification At Bedside   Request Evaluate in Person   Notification Reason Status Update;ISRRAEL PATE on the unit.  SVE: 8 cm and feeling pressure.  Rotating from left to right sides.  Straight cath'd for 50 ml. Will continue to evaluate.

## 2020-09-04 NOTE — PROVIDER NOTIFICATION
09/04/20 0353   Provider Notification   Provider Name/Title Dr. Michelle   Method of Notification Electronic Page   Notification Reason SVE;Status Update   Web page sent to MD updating on SVE 5/80/-2 and pit started at 0130. Will continue to update as needed.

## 2020-09-04 NOTE — PLAN OF CARE
Data: Patient presented to [facility]: 9/3/2020  9:52 PM.  Reason for maternal/fetal assessment is uterine contractions, leaking vaginal fluid. Patient reports SROM at 2100 for clear fluid with intermittent contractions the last few days.  Patient is a .  Prenatal record reviewed. Pregnancy has been uncomplicated..  Gestational Age 40w4d. VSS. Fetal movement present. Patient denies vaginal bleeding, pelvic pressure, nausea, vomiting, headache, visual disturbances, epigastric or URQ pain, significant edema. Support person is present.   Action: Verbal consent for EFM. Triage assessment completed. Bill of rights reviewed. ROM+ collected. SVE 60/-2, small change from office visit yesterday   Response: Patient verbalized agreement with plan. Will contact Dr Kayleigh Michelle with update and further orders.

## 2020-09-04 NOTE — PROVIDER NOTIFICATION
09/03/20 6057   Provider Notification   Provider Name/Title Dr Michelle   Method of Notification Phone   Request Evaluate - Remote   Notification Reason Patient Arrived;Status Update;Membrane Status;SVE   MD updated that ROM+ came back positive. SROM at 2100 this evening for clear fluid. Patient GBS+ with NKDA. TORB to place intrapartum orders and start PCN. If no significant cervical change by 0100 RN may start pitocin augmentation at that time. Will continue to monitor and update MD as needed.

## 2020-09-04 NOTE — L&D DELIVERY NOTE
Delivery Summary    30 year old  presented at 40w4d with spontaneous rupture of membranes that occurred at 2100, 9/3.  GBS positive status  Cervix on admission was 2/60/-2 at 0105,   Pitocin augmentation ensued with max to 8 mU/min  On my arrival for my on-call shift, she was 5-6 cm dilated at 0455,   Category 2 fetal heart tracing with normal baseline, recurrent variable decels with rare late decels, and recovery to normal baseline with moderate variability; pitocin discontinued as part of resuscitative measures along with IV saline fluid continuous infusion  Progressed to complete/+2 at 0815,   Category 3 fetal heart tracing ensued with prolonged decelerations nadiring to 80 bpm for 1-2 minute duration and return to baseline.     At that time, the need for expedient delivery was expressed to patient due to fetal status. Discussed vacuum delivery vs primary  section along with associated benefits and risks. Patient elected to proceed with vacuum delivery and conveyed understanding of risks reviewed related to vacuum delivery which included but not limited to  fetal injury (scalp lacerations, bleeding/bruising under scalp, bleeding into brain or eyes, low apgar score), maternal injury (perineal lacerations) as well as risk of failure requiring a  section.     Assessment of fetus prior to vacuum application was FHR baseline 120, moderate variability, no accels, recurrent prolonged decels to ethan 80 bpm, vertex with moderate caput, EFW 3500, position occiput anterior. Maternal pelvis noted to have vertex at +2 station without pushing effort, brought down to +3 with pushing effort.    The patient's bladder had been previously straight catheterized for 50 mL  The Kiwi vacuum was placed on the fetal vertex approximately 2 cm anterior to the posterior fontanelle. With onset of contraction the vacuum suction was applied into the green zone and traction applied with maternal pushing efforts.  The vacuum was released between contractions. Patient pushed over 4 contractions with slow descent of the fetal vertex and delivered occiput anterior and the vacuum removed.    Right anterior shoulder dystocia was encountered which lasted about 30 seconds; resolved with suprapubic pressure, Sanjeev and midline episiotomy. Standard maneuvers were used to complete delivery of the rest of the fetus following resolution of shoulder dystocia.     Tight nuchal cord was noted at the time of shoulder dystocia which was reduced prior to resolution of shoulder dystocia.     The umbilical cord was immediately doubly clamped and cut and the infant handed off to waiting NICU staff. A segment of the umbilical cord was set aside for cord gases.    Examination of the maternal perineum revealed a 2d laceration which was repaired in standard fashion.     The placenta was delivered spontaneously, intact and with a 3 vessel cord.    Weight of infant: 7 lbs 9 oz  Apgar: 6 and 8 at 1 and 5 minutes, respecitvely  QBL was 200 mL  Infant's name is Moisés Proctor MD  [facility]

## 2020-09-04 NOTE — PLAN OF CARE
Pitocin started per MD orders as no significant cervical change at 0105 exam. SVE 2.5/70/-2. See flowsheet.

## 2020-09-04 NOTE — PLAN OF CARE
Data[facility] Baby transferred via parent's arms.  Action: Receiving unit notified of transfer: Yes. Patient and family notified of room change. Report given to Estrella SHERWOOD at 1130. Belongings sent to receiving unit. Accompanied by Registered Nurse. Oriented patient to surroundings. Call light within reach. ID bands double-checked with receiving RN.  Response: Patient tolerated transfer and is stable.

## 2020-09-04 NOTE — PROVIDER NOTIFICATION
20 0815   Provider Notification   Provider Name/Title  Keisha   Method of Notification At Bedside   Request Evaluate in Person;Attend Delivery   Notification Reason Decels;SVE;Status Update     MD at bedside for SVE: 10 cm and feeling pressure.   [id number] used for delivery.  Patient positioned for pushing.  Bed was broken down. Didi COLON RN in room as second RN. Patient instructed on pushing, but is pushing ineffectively with and without contractions.   used to clarify good pushing technique as well as to make sure patient is using her contractions for the most effective pushing and resting between. MD explaining the importance of delivering baby because of fetal distress.  Verbal consent obtained for a vacuum assisted delivery at 0827. If vacuum delivery was unsuccessful verbal consent was obtained to move to a STAT  section, charge nurse informed and NICU team was paged.  Vacuum applied at 0828.  Vacuum had a total of 5 pulls with 2 pop offs. Shoulder dystocia called by MD and Valeri EMANUEL At the bedside for assistance.  Suprapubic pressure applied and baby was delivered at 0835 and NICU team was present in the room at this time.

## 2020-09-05 VITALS
DIASTOLIC BLOOD PRESSURE: 65 MMHG | HEART RATE: 82 BPM | TEMPERATURE: 98.7 F | SYSTOLIC BLOOD PRESSURE: 104 MMHG | RESPIRATION RATE: 18 BRPM

## 2020-09-05 PROCEDURE — 25000132 ZZH RX MED GY IP 250 OP 250 PS 637: Performed by: OBSTETRICS & GYNECOLOGY

## 2020-09-05 RX ORDER — IBUPROFEN 200 MG
400 TABLET ORAL EVERY 4 HOURS PRN
COMMUNITY
Start: 2020-09-05 | End: 2022-02-17

## 2020-09-05 RX ORDER — ACETAMINOPHEN 325 MG/1
650 TABLET ORAL EVERY 4 HOURS PRN
COMMUNITY
Start: 2020-09-05 | End: 2022-02-17

## 2020-09-05 RX ADMIN — IBUPROFEN 800 MG: 800 TABLET ORAL at 09:15

## 2020-09-05 RX ADMIN — IBUPROFEN 800 MG: 800 TABLET ORAL at 16:04

## 2020-09-05 RX ADMIN — DOCUSATE SODIUM 50 MG AND SENNOSIDES 8.6 MG 1 TABLET: 8.6; 5 TABLET, FILM COATED ORAL at 09:16

## 2020-09-05 RX ADMIN — PRENATAL VITAMINS-IRON FUMARATE 27 MG IRON-FOLIC ACID 0.8 MG TABLET 1 TABLET: at 09:15

## 2020-09-05 NOTE — PLAN OF CARE
Pt discharged to home in stable condition.  Education and follow up reviewed with pt via Icelandic  on iPad.  Birth certificate completed.  Pt ambulated off unit by RN with friend.  ID bands matched with infant.  Questions/concerns addressed.  Pt discharged to home.

## 2020-09-05 NOTE — PLAN OF CARE
"VSS. Voiding without difficulty. Tolerating regular diet well. Education about breastfeeding and how to take care of  done with  but patient seems very confused. FOB explained \"She did not go to school. She can't read\". Pt needs reminders to feed the baby. Would like to go home on Saturday (2020) if possible.  "

## 2020-09-05 NOTE — PLAN OF CARE
Pt stable throughout shift.  VSS and WNL.  Up voiding independently without difficulty.  Breastfeeding baby well with a LATCH score of 10.  Did try formula this overnight, but has only breast fed this shift.  Taking tylenol and ibuprofen for pain/comfort.  Bonding well with baby. Providing cares for self and baby independently.    Discharge instructions reviewed with patient with a Mongolian  through the IPAD.  Patient will be ready for Discharge around 1600 when friend comes to .  Pt still needs to decide baby's last name for birth certificate.  [facility] ordered.

## 2020-09-05 NOTE — PROGRESS NOTES
[facility] Obstetrics Post-Partum Progress Note          Assessment and Plan:    Assessment:   Post-partum day #1  30 year old  presented at 40w4d with spontaneous rupture of membranes that occurred at 2100, 9/3.  Delivery occurred at 08:35 on 20  L&D complications: VE assisted vag delivery for Cat 3 tracing   Delivery complicated by a R shoulder dystocia  Pt desires F/C home today      Doing well.  Clean wound without signs of infection.  Normal healing wound.  No excessive bleeding  Pain well-controlled.      Plan:   Ambulation encouraged  Breast feeding strategies discussed  Monitor wound for signs of infection  Pain control measures as needed  Reportable signs and symptoms dicussed with the patient  Significance of a shoulder dystocia discussed with pt as were the discharge instructions with the aid of the French   Pt understands and accepts  Take your tempature twice daily for 3 days and call if > 100.4  Nothing in vagina for 6 wks  Continue taking prenatal MVI daily for 1 month    Discharge later today           Interval History:   Doing well.  Pain is well-controlled.  No fevers.  No history of foul-smelling vaginal discharge.  Good appetite.  Denies chest pain, shortness of breath, nausea or vomiting.  Vaginal bleeding is similar to a heavy menstrual flow.  Ambulatory.  Breastfeeding well.          Significant Problems:    No past medical history on file.          Review of Systems:    The patient denies any chest pain, shortness of breath, excessive pain, fever, chills, purulent drainage from the wound, nausea or vomiting.          Medications:     All medications related to the patient's surgery have been reviewed  Current Facility-Administered Medications   Medication     acetaminophen (TYLENOL) tablet 650 mg     [START ON 2020] bisacodyl (DULCOLAX) Suppository 10 mg     ePHEDrine injection 5 mg     fentaNYL (SUBLIMAZE) 2 mcg/mL, bupivacaine (MARCAINE) 0.125% in NS premix  for PCEA     hydrocortisone 2.5 % cream     ibuprofen (ADVIL/MOTRIN) tablet 800 mg     IF subcutaneous (SQ) Unfractionated heparin (UFH) ordered for thromboprophylaxis    Or     IF intravenous (IV) Unfractionated heparin (UFH) ordered    Or     IF LOW-dose Low molecular weight heparin (LMWH) thromboprophylaxis ordered    Or     IF HIGHER-dose Low molecular weight heparin (LMWH) thromboprophylaxis ordered     lactated ringers BOLUS 1,000 mL     lactated ringers BOLUS 1,000 mL    Or     lactated ringers BOLUS 500 mL     lactated ringers BOLUS 250 mL     lanolin cream     medication instruction     nalbuphine (NUBAIN) injection 2.5-5 mg     naloxone (NARCAN) injection 0.1-0.4 mg     naloxone (NARCAN) injection 0.1-0.4 mg     No MMR Needed - Assessment: Patient does not need MMR vaccine     NO Rho (D) immune globulin (RhoGam) needed - mother Rh POSITIVE     No Tdap Needed - Assessment: Patient does not need Tdap vaccine     ondansetron (ZOFRAN-ODT) ODT tab 4 mg    Or     ondansetron (ZOFRAN) injection 4 mg     Opioid plan postpartum - medication instruction     oxytocin (PITOCIN) 30 units in 500 mL 0.9% NaCl infusion     oxytocin (PITOCIN) 30 units in 500 mL 0.9% NaCl infusion     oxytocin (PITOCIN) injection 10 Units     prenatal multivitamin w/iron per tablet 1 tablet     senna-docusate (SENOKOT-S/PERICOLACE) 8.6-50 MG per tablet 1 tablet    Or     senna-docusate (SENOKOT-S/PERICOLACE) 8.6-50 MG per tablet 2 tablet     [START ON 9/6/2020] sodium phosphate (FLEET ENEMA) 1 enema     tranexamic acid 1 g in 100 mL 0.7% NaCl IV bag (premix)             Physical Exam:   Vitals were reviewed  All vitals stable  Temp: 97.7  F (36.5  C) Temp src: Oral BP: 99/47 Pulse: 89   Resp: 18        Uterine fundus is firm, non-tender and at the level of the umbilicus          Data:     All laboratory data related to this surgery reviewed  Hemoglobin   Date Value Ref Range Status   09/03/2020 11.9 11.7 - 15.7 g/dL Final   05/26/2020 10.0  (L) 11.7 - 15.7 g/dL Final   03/20/2020 11.7 11.7 - 15.7 g/dL Final     No imaging studies have been ordered    Jasvir Mendez MD

## 2020-09-05 NOTE — DISCHARGE INSTRUCTIONS
Postpartum Vaginal Delivery Instructions    Lactation: [phone]  [facility]: [phone]      Activity       Ask family and friends for help when you need it.    Do not place anything in your vagina for 6 weeks.    You are not restricted on other activities, but take it easy for a few weeks to allow your body to recover from delivery.  You are able to do any activities you feel up to that point.    No driving until you have stopped taking your pain medications (usually two weeks after delivery).     Call your health care provider if you have any of these symptoms:       Increased pain, swelling, redness, or fluid around your stiches from an episiotomy or perineal tear.    A fever above 100.4 F (38 C) with or without chills when placing a thermometer under your tongue.    You soak a sanitary pad with blood within 1 hour, or you see blood clots larger than a golf ball.    Bleeding that lasts more than 6 weeks.    Vaginal discharge that smells bad.    Severe pain, cramping or tenderness in your lower belly area.    A need to urinate more frequently (use the toilet more often), more urgently (use the toilet very quickly), or it burns when you urinate.    Nausea and vomiting.    Redness, swelling or pain around a vein in your leg.    Problems breastfeeding or a red or painful area on your breast.    Chest pain and cough or are gasping for air.    Problems coping with sadness, anxiety, or depression.  If you have any concerns about hurting yourself or the baby, call your provider immediately.     You have questions or concerns after you return home.     Keep your hands clean:  Always wash your hands before touching your perineal area and stitches.  This helps reduce your risk of infection.  If your hands aren't dirty, you may use an alcohol hand-rub to clean your hands. Keep your nails clean and short.

## 2020-09-05 NOTE — PLAN OF CARE
VSS. Declines need for pain medication. Up independently to bathroom. SO at bedside, translating for patient. Encouraged patient/SO to call for breastfeeding assistance.

## 2021-01-04 ENCOUNTER — HEALTH MAINTENANCE LETTER (OUTPATIENT)
Age: 31
End: 2021-01-04

## 2021-02-17 ENCOUNTER — OFFICE VISIT (OUTPATIENT)
Dept: OBGYN | Facility: CLINIC | Age: 31
End: 2021-02-17
Payer: COMMERCIAL

## 2021-02-17 VITALS — BODY MASS INDEX: 33.64 KG/M2 | DIASTOLIC BLOOD PRESSURE: 70 MMHG | SYSTOLIC BLOOD PRESSURE: 114 MMHG | WEIGHT: 189.9 LBS

## 2021-02-17 DIAGNOSIS — Z30.011 OCP (ORAL CONTRACEPTIVE PILLS) INITIATION: ICD-10-CM

## 2021-02-17 DIAGNOSIS — Z01.419 ENCOUNTER FOR GYNECOLOGICAL EXAMINATION WITHOUT ABNORMAL FINDING: Primary | ICD-10-CM

## 2021-02-17 DIAGNOSIS — Z12.4 PAP SMEAR FOR CERVICAL CANCER SCREENING: ICD-10-CM

## 2021-02-17 PROCEDURE — 87624 HPV HI-RISK TYP POOLED RSLT: CPT | Performed by: OBSTETRICS & GYNECOLOGY

## 2021-02-17 PROCEDURE — 99395 PREV VISIT EST AGE 18-39: CPT | Performed by: OBSTETRICS & GYNECOLOGY

## 2021-02-17 PROCEDURE — G0145 SCR C/V CYTO,THINLAYER,RESCR: HCPCS | Performed by: OBSTETRICS & GYNECOLOGY

## 2021-02-17 RX ORDER — ACETAMINOPHEN AND CODEINE PHOSPHATE 120; 12 MG/5ML; MG/5ML
0.35 SOLUTION ORAL DAILY
Qty: 90 TABLET | Refills: 3 | Status: SHIPPED | OUTPATIENT
Start: 2021-02-17 | End: 2022-02-17

## 2021-02-17 NOTE — PROGRESS NOTES
SUBJECTIVE:                                                      Svetlana is a 31 year old  female who presents for annual exam. Her  is present and helping to translate.  Her preferred language is Congolese.  They immigrated form the [facility] in 2019.   utilized today via IPad    She had a VEVD in 2020 and never returned for a PP visit.    No LMP recorded.. Menses are rare as patient is nursing.  One period so far on 21.  No I/C since  Using none for contraception.  She is not currently considering pregnancy.  Besides routine health maintenance,  she would like to discuss contraception.  GYNECOLOGIC HISTORY:    Svetlana is not sexually active with  male partner and is currently in a monogamous relationship.      History sexually transmitted infections:No STD history    History of abnormal Pap smear: NO - age 30- 65 PAP every 3 years recommended  Family history of breast CA: No  Family history of uterine/ovarian CA: No    Family history of colon CA: No      HISTORY:  OB History    Para Term  AB Living   2 2 2 0 0 2   SAB TAB Ectopic Multiple Live Births   0 0 0 0 2      # Outcome Date GA Lbr Dhruv/2nd Weight Sex Delivery Anes PTL Lv   2 Term 20 40w5d / 00:20 3.45 kg (7 lb 9.7 oz) M Vag-Vacuum IV REGIONAL N SHAHANA      Birth Comments: Shoulder dystocia during labor and delivery [[id number]]      Complications: Shoulder Dystocia, Shoulder dystocia during labor and delivery      Name: NGORANEPSEGOULEHI,MALE-SVETLANA      Apgar1: 6  Apgar5: 8   1 Term 11   3.67 kg (8 lb 1.5 oz) M  None  SHAHANA     No past medical history on file.  Past Surgical History:   Procedure Laterality Date     NO HISTORY OF SURGERY       No family history on file.  Social History     Socioeconomic History     Marital status:      Spouse name: Mauricio Bennett     Number of children: 1     Years of education: Not on file     Highest education level: 4th grade   Occupational History     Occupation: stay at  home   Social Needs     Financial resource strain: Not hard at all     Food insecurity     Worry: Never true     Inability: Never true     Transportation needs     Medical: No     Non-medical: No   Tobacco Use     Smoking status: Never Smoker     Smokeless tobacco: Never Used   Substance and Sexual Activity     Alcohol use: Never     Frequency: Never     Drug use: Never     Sexual activity: Yes     Partners: Male   Lifestyle     Physical activity     Days per week: Not on file     Minutes per session: Not on file     Stress: Not on file   Relationships     Social connections     Talks on phone: Not on file     Gets together: Not on file     Attends Latter-day service: Not on file     Active member of club or organization: Not on file     Attends meetings of clubs or organizations: Not on file     Relationship status: Not on file     Intimate partner violence     Fear of current or ex partner: Not on file     Emotionally abused: Not on file     Physically abused: Not on file     Forced sexual activity: Not on file   Other Topics Concern     Not on file   Social History Narrative     Not on file       Current Outpatient Medications:      acetaminophen (TYLENOL) 325 MG tablet, Take 2 tablets (650 mg) by mouth every 4 hours as needed for mild pain or fever (greater than or equal to 38  C /100.4  F (oral) or 38.5  C/ 101.4  F (core).), Disp:  , Rfl:      ibuprofen (ADVIL/MOTRIN) 200 MG tablet, Take 2 tablets (400 mg) by mouth every 4 hours as needed for other (cramping), Disp: , Rfl:      Prenatal Vit-Fe Fumarate-FA (PRENATAL MULTIVITAMIN W/IRON) 27-0.8 MG tablet, Take 1 tablet by mouth daily, Disp: 90 tablet, Rfl: 3   No Known Allergies    Past medical, surgical, social and family history were reviewed and updated in EPIC.    ROS:   12 point review of systems negative other than symptoms noted below.      OBJECTIVE:                                                      EXAM:  There were no vitals taken for this visit.    BMI: There is no height or weight on file to calculate BMI.  General: Alert and oriented, no distress.  Psychiatric: Mood and affect within normal limits.  Skin: Warm and dry, no lesions, rashes or discolorations.  Abdomen: Soft, nontender, no hepatosplenomegaly, no rebound or guarding, no masses, no hernias.   Vulva:  No external lesions, normal female hair distribution, no inguinal adenopathy.    Urethra:  Midline, non-tender, well supported, no discharge  Vagina:  Well-estrogenized, no abnormal discharge, no lesions  Cervix: no lesions, no discharge, multiparous and Pap obtained  Uterus:  anteverted, smooth contour, without enlargement, mobile, and without tenderness  Ovaries:  No masses appreciated, non-tender, mobile  Rectal Exam: deferred  Musculoskeletal: extremities normal      COUNSELING:   Reviewed preventive health counseling, as reflected in patient instructions       Contraception       Family planning   reports that she has never smoked. She has never used smokeless tobacco.        ASSESSMENT/PLAN:                                                      31 year old female with satisfactory annual exam  (Z01.419) Encounter for gynecological examination without abnormal finding  (primary encounter diagnosis)  Comment:   Plan: norethindrone (MICRONOR) 0.35 MG tablet, HPV         High Risk Types DNA Cervical, Pap imaged thin         layer screen with HPV - recommended age 30 - 65        years (select HPV order below)            (Z30.011) OCP (oral contraceptive pills) initiation  Comment:   Plan: norethindrone (MICRONOR) 0.35 MG tablet, HPV         High Risk Types DNA Cervical, Pap imaged thin         layer screen with HPV - recommended age 30 - 65        years (select HPV order below)            (Z12.4) Pap smear for cervical cancer screening  Comment:   Plan: HPV High Risk Types DNA Cervical, Pap imaged         thin layer screen with HPV - recommended age 30        - 65 years (select HPV order below)               Dexter Das MD

## 2021-02-23 LAB
COPATH REPORT: NORMAL
PAP: NORMAL

## 2021-02-24 LAB
FINAL DIAGNOSIS: NORMAL
HPV HR 12 DNA CVX QL NAA+PROBE: NEGATIVE
HPV16 DNA SPEC QL NAA+PROBE: NEGATIVE
HPV18 DNA SPEC QL NAA+PROBE: NEGATIVE
SPECIMEN DESCRIPTION: NORMAL
SPECIMEN SOURCE CVX/VAG CYTO: NORMAL

## 2021-10-10 ENCOUNTER — HEALTH MAINTENANCE LETTER (OUTPATIENT)
Age: 31
End: 2021-10-10

## 2022-02-17 ENCOUNTER — PRENATAL OFFICE VISIT (OUTPATIENT)
Dept: NURSING | Facility: CLINIC | Age: 32
End: 2022-02-17
Payer: COMMERCIAL

## 2022-02-17 VITALS — HEIGHT: 65 IN | BODY MASS INDEX: 27.01 KG/M2 | WEIGHT: 162.1 LBS

## 2022-02-17 DIAGNOSIS — Z34.80 PRENATAL CARE, SUBSEQUENT PREGNANCY, UNSPECIFIED TRIMESTER: Primary | ICD-10-CM

## 2022-02-17 DIAGNOSIS — O99.011 ANEMIA DURING PREGNANCY IN FIRST TRIMESTER: Primary | ICD-10-CM

## 2022-02-17 DIAGNOSIS — O99.011 ANEMIA DURING PREGNANCY IN FIRST TRIMESTER: ICD-10-CM

## 2022-02-17 LAB
ABO/RH(D): NORMAL
ANTIBODY SCREEN: NEGATIVE
ERYTHROCYTE [DISTWIDTH] IN BLOOD BY AUTOMATED COUNT: 13.4 % (ref 10–15)
HBV SURFACE AG SERPL QL IA: NONREACTIVE
HCT VFR BLD AUTO: 31.1 % (ref 35–47)
HCV AB SERPL QL IA: NONREACTIVE
HGB BLD-MCNC: 10 G/DL (ref 11.7–15.7)
HIV 1+2 AB+HIV1 P24 AG SERPL QL IA: NONREACTIVE
MCH RBC QN AUTO: 29.2 PG (ref 26.5–33)
MCHC RBC AUTO-ENTMCNC: 32.2 G/DL (ref 31.5–36.5)
MCV RBC AUTO: 91 FL (ref 78–100)
PLATELET # BLD AUTO: 224 10E3/UL (ref 150–450)
RBC # BLD AUTO: 3.43 10E6/UL (ref 3.8–5.2)
RUBV IGG SERPL QL IA: 10.4 INDEX
RUBV IGG SERPL QL IA: POSITIVE
SPECIMEN EXPIRATION DATE: NORMAL
T PALLIDUM AB SER QL: NONREACTIVE
WBC # BLD AUTO: 5.6 10E3/UL (ref 4–11)

## 2022-02-17 PROCEDURE — 87340 HEPATITIS B SURFACE AG IA: CPT

## 2022-02-17 PROCEDURE — 99207 PR NO CHARGE NURSE ONLY: CPT

## 2022-02-17 PROCEDURE — 86900 BLOOD TYPING SEROLOGIC ABO: CPT

## 2022-02-17 PROCEDURE — 82728 ASSAY OF FERRITIN: CPT

## 2022-02-17 PROCEDURE — 36415 COLL VENOUS BLD VENIPUNCTURE: CPT

## 2022-02-17 PROCEDURE — 86762 RUBELLA ANTIBODY: CPT

## 2022-02-17 PROCEDURE — 85027 COMPLETE CBC AUTOMATED: CPT

## 2022-02-17 PROCEDURE — 86803 HEPATITIS C AB TEST: CPT

## 2022-02-17 PROCEDURE — 86780 TREPONEMA PALLIDUM: CPT

## 2022-02-17 PROCEDURE — 87389 HIV-1 AG W/HIV-1&-2 AB AG IA: CPT

## 2022-02-17 PROCEDURE — 86850 RBC ANTIBODY SCREEN: CPT

## 2022-02-17 PROCEDURE — 87086 URINE CULTURE/COLONY COUNT: CPT

## 2022-02-17 PROCEDURE — 86901 BLOOD TYPING SEROLOGIC RH(D): CPT

## 2022-02-17 RX ORDER — PYRIDOXINE HCL (VITAMIN B6) 25 MG
25 TABLET ORAL 3 TIMES DAILY
Qty: 90 TABLET | Refills: 3 | Status: SHIPPED | OUTPATIENT
Start: 2022-02-17

## 2022-02-17 RX ORDER — ASCORBIC ACID, CHOLECALCIFEROL, .ALPHA.-TOCOPHEROL ACETATE, DL-, PYRIDOXINE, FOLIC ACID, CYANOCOBALAMIN, CALCIUM, FERROUS FUMARATE, MAGNESIUM, DOCONEXENT 85; 200; 10; 25; 1; 12; 140; 27; 45; 300 [IU]/1; [IU]/1; [IU]/1; [IU]/1; MG/1; UG/1; MG/1; MG/1; MG/1; MG/1
1 CAPSULE, GELATIN COATED ORAL DAILY
Qty: 100 CAPSULE | Refills: 3 | Status: SHIPPED | OUTPATIENT
Start: 2022-02-17 | End: 2022-04-28

## 2022-02-17 NOTE — PROGRESS NOTES
NPN nurse visit done over the phone. Pt will be given NPN folder and book at her upcoming appt.   Discussed optional screening available to assess chromosomal anomalies. Questions answered. Pt advised to call the clinic if she has any questions or concerns related to her pregnancy. Prenatal labs will be obtained at her upcoming appt. New prenatal visit scheduled on 2/23/22 with Sheree Andrew CNM.    13w1d    Lab Results   Component Value Date    PAP NIL 02/17/2021           Patient supplied answers from flow sheet for:  Prenatal OB Questionnaire.  Past Medical History  Have you ever recieved care for your mental health? : No  Have you ever been in a major accident or suffered serious trauma?: No  Within the last year, has anyone hit, slapped, kicked or otherwise hurt you?: No  In the last year, has anyone forced you to have sex when you didn't want to?: No    Past Medical History 2   Have you ever received a blood transfusion?: No  Would you accept a blood transfusion if was medically recommended?: Yes  Does anyone in your home smoke?: No   Is your blood type Rh negative?: No  Have you ever ?: (!) Yes  Have you been hospitalized for a nonsurgical reason excluding normal delivery?: No  Have you ever had an abnormal pap smear?: No    Past Medical History (Continued)  Do you have a history of abnormalities of the uterus?: No  Did your mother take JEREMIAH or any other hormones when she was pregnant with you?: No  Do you have any other problems we have not asked about which you feel may be important to this pregnancy?: No

## 2022-02-18 DIAGNOSIS — O99.011 ANEMIA AFFECTING PREGNANCY IN FIRST TRIMESTER: Primary | ICD-10-CM

## 2022-02-18 LAB
BACTERIA UR CULT: NO GROWTH
FERRITIN SERPL-MCNC: 38 NG/ML (ref 12–150)

## 2022-02-18 RX ORDER — FERROUS SULFATE 325(65) MG
325 TABLET ORAL EVERY OTHER DAY
Qty: 30 TABLET | Refills: 3 | Status: SHIPPED | OUTPATIENT
Start: 2022-02-18 | End: 2022-03-23

## 2022-02-18 RX ORDER — LANOLIN ALCOHOL/MO/W.PET/CERES
1000 CREAM (GRAM) TOPICAL DAILY
Qty: 30 TABLET | Refills: 4 | Status: SHIPPED | OUTPATIENT
Start: 2022-02-18 | End: 2022-04-28

## 2022-02-18 RX ORDER — MULTIVIT WITH MINERALS/LUTEIN
250 TABLET ORAL DAILY
Qty: 30 TABLET | Refills: 4 | Status: SHIPPED | OUTPATIENT
Start: 2022-02-18 | End: 2022-04-28

## 2022-02-22 ENCOUNTER — ANCILLARY PROCEDURE (OUTPATIENT)
Dept: ULTRASOUND IMAGING | Facility: CLINIC | Age: 32
End: 2022-02-22
Payer: COMMERCIAL

## 2022-02-22 DIAGNOSIS — Z34.80 PRENATAL CARE, SUBSEQUENT PREGNANCY, UNSPECIFIED TRIMESTER: ICD-10-CM

## 2022-02-22 PROCEDURE — 76805 OB US >/= 14 WKS SNGL FETUS: CPT | Performed by: OBSTETRICS & GYNECOLOGY

## 2022-02-23 ENCOUNTER — PRENATAL OFFICE VISIT (OUTPATIENT)
Dept: MIDWIFE SERVICES | Facility: CLINIC | Age: 32
End: 2022-02-23
Payer: COMMERCIAL

## 2022-02-23 VITALS
DIASTOLIC BLOOD PRESSURE: 56 MMHG | WEIGHT: 159.9 LBS | SYSTOLIC BLOOD PRESSURE: 90 MMHG | HEIGHT: 65 IN | BODY MASS INDEX: 26.64 KG/M2

## 2022-02-23 DIAGNOSIS — R10.9 ABDOMINAL PAIN IN PREGNANCY, SECOND TRIMESTER: ICD-10-CM

## 2022-02-23 DIAGNOSIS — Z34.82 ENCOUNTER FOR SUPERVISION OF OTHER NORMAL PREGNANCY, SECOND TRIMESTER: Primary | ICD-10-CM

## 2022-02-23 DIAGNOSIS — O99.012 ANEMIA IN PREGNANCY, SECOND TRIMESTER: ICD-10-CM

## 2022-02-23 DIAGNOSIS — O26.892 ABDOMINAL PAIN IN PREGNANCY, SECOND TRIMESTER: ICD-10-CM

## 2022-02-23 LAB
ALBUMIN UR-MCNC: ABNORMAL MG/DL
APPEARANCE UR: CLEAR
BILIRUB UR QL STRIP: NEGATIVE
COLOR UR AUTO: YELLOW
GLUCOSE UR STRIP-MCNC: NEGATIVE MG/DL
HGB UR QL STRIP: NEGATIVE
KETONES UR STRIP-MCNC: NEGATIVE MG/DL
LEUKOCYTE ESTERASE UR QL STRIP: NEGATIVE
NITRATE UR QL: NEGATIVE
PH UR STRIP: 8.5 [PH] (ref 5–7)
RBC #/AREA URNS AUTO: NORMAL /HPF
SP GR UR STRIP: 1.02 (ref 1–1.03)
UROBILINOGEN UR STRIP-ACNC: 1 E.U./DL
WBC #/AREA URNS AUTO: NORMAL /HPF

## 2022-02-23 PROCEDURE — 81001 URINALYSIS AUTO W/SCOPE: CPT | Performed by: ADVANCED PRACTICE MIDWIFE

## 2022-02-23 PROCEDURE — 87591 N.GONORRHOEAE DNA AMP PROB: CPT | Performed by: ADVANCED PRACTICE MIDWIFE

## 2022-02-23 PROCEDURE — 87491 CHLMYD TRACH DNA AMP PROBE: CPT | Performed by: ADVANCED PRACTICE MIDWIFE

## 2022-02-23 PROCEDURE — 99207 PR FIRST OB VISIT: CPT | Performed by: ADVANCED PRACTICE MIDWIFE

## 2022-02-23 RX ORDER — FERROUS GLUCONATE 324(38)MG
324 TABLET ORAL
Qty: 90 TABLET | Refills: 4 | Status: SHIPPED | OUTPATIENT
Start: 2022-02-23

## 2022-02-23 NOTE — PATIENT INSTRUCTIONS
Thank you for coming to see the Midwives at the   [facility]      We will notify you about your labs that were drawn today once we get the results back or if you have [facility] they will be posted there as well      We will call you personally with results that require further discussion      If any referrals were ordered today you should be getting a call in the next week or you may need to call the number listed with your referral to schedule.            If you need any refills of medications please call your pharmacy and they will contact us      If you have any questions or concerns before your next visit, you can reach the nurse midwife on call by calling the clinic number at [phone].      If you  wish to schedule another appointment, please call our office at [phone]. You can also make appointments through [facility]        If you have a medical emergency please call 105.    Because you are pregnant, we have additional resources for you:      You may call our consulting RN's during normal business hours for non-urgent questions about your pregnancy.      After hours you may also page the midwife on call for urgent questions or issues by calling the triage line at [phone].  There is always a midwife on call 24 hours a day.    Prenatal Reminders:    Before 14 weeks: Dating ultrasound, Genetic testing       This ultrasound helps us determine your dates accurately. Verifi can be drawn anytime after 10 weeks of gestation  16 weeks: Optional genetic testing (quad screen) or single AFP       This testing helps understand your baby's risk for some genetic abnormalities.  20 weeks:  Screening ultrasound (fetal survey)       This testing will look for early growth abnormalities, and may tell the baby's sex if you wish to find out.  28 weeks: One hour sugar test (GCT), hemoglobin and platelets       This test helps identify diabetes of pregnancy or gestational diabetes.  We also look      at the iron in  your blood and how well your blood clots.  28 - 36 weeks: Tetanus shot (Tdap)       This shot helps protect you and your baby from tetanus and whooping cough.  36 weeks and later: Group B Strep test (GBS)       This test helps predict if you need antibiotics in labor to prevent infection in your baby.  Anytime September to April:  Flu shot       This shot helps protect you and your family from the flu.  This is especially important during pregnancy        Any time during or after your pregnancy you may experience increased depression and/or mood changes.    We are here to support you. Please contact us if you are:    Feeling anxious    Overwhelmed or sad     Trouble sleeping    Crying uncontrollably    Trouble caring for yourself or baby.    The typical schedule after your first visit today you can expect:     Visit 2 - 12-16 weeks  Visit 3 - 20 weeks  Visit 4 - 24 weeks  Visit 5 - 28 weeks  Visit 6 - 32 weeks  Visit 7 - 34 weeks  Visit 8 - 36 weeks  Weekly after 36 weeks until delivery.    If anything comes up between your visits or you have concerns please don't hesitate to contact us.    Secure access to your medical record:  Use Blitsy (secure email communication and access to your chart) to send your primary care provider a message or make an appointment. Ask someone on your Team how to sign up for Blitsy. To log on to [facility] or for more information in Blitsy please visit the website at [email or web address].       Certified Nurse Midwife (CNM) Team  JOSELYN Quintero, AURY Castelan APRN, CNM  JOSELYN Hatfield, CNM  JOSELYN Merritt, CNM  JOSELYN Villavicencio, CNM    Again, thank you for choosing the midwives at [facility].  We are excited to be a part of your pregnancy. Please let us know how we can best partner with you to improve your and your family's health.

## 2022-02-23 NOTE — NURSING NOTE
"Chief Complaint   Patient presents with     Prenatal Care     New OB, DYLLAN: 2022 per ultrasound on 2022, 17w 5d,        Initial BP 90/56 (BP Location: Right arm, Cuff Size: Adult Regular)   Ht 1.651 m (5' 5\")   Wt 72.5 kg (159 lb 14.4 oz)   LMP 2021 (Within Weeks)   Breastfeeding No   BMI 26.61 kg/m   Estimated body mass index is 26.61 kg/m  as calculated from the following:    Height as of this encounter: 1.651 m (5' 5\").    Weight as of this encounter: 72.5 kg (159 lb 14.4 oz).  BP completed using cuff size: regular    Questioned patient about current smoking habits.  Pt. has never smoked.          The following HM Due: NONE      "

## 2022-02-23 NOTE — LETTER
[facility]  [address], [address]  [place], [place]  [address]                                            TEL:[phone]  FAX:[phone]          Svetlana Sanches  [address]    [facility] [address]          February 23, 2022          Due to increased risk of injury in pregnancy, I am recommending [place] limit all lifting to no more than 20 pounds for the duration of her pregnancy.      Please feel free to call me if you have any questions or concerns.               Sincerely,                JOSELYN Quintero, CNM

## 2022-02-23 NOTE — PROGRESS NOTES
Patient is here today with an [place]ian  via Ipad.      Svetlana Sanches is a 32 year old   woman,  who is not a previous CNM patient. She presents for a new OB Visit. This was not a planned pregnancy.     FOB is prefer not to say who is in good health.  FOB IS NOT actively involved in relationship and this pregnancy.     Svetlana presents for her first OB visit today. She had nausea and vomiting earlier in the pregnancy which has now resolved. She has been noting some intermittent suprapubic pain. Feels like a dull ache just above her pubic bone.  She has not had bleeding since her LMP.    She denies abdominal pain since her LMP.  She has not had nausea.  has not had vomiting, now improved  Any personal or family history of blood clots? No  History of sickle cell anemia or trait? No         Patient's last menstrual period was 2021 (within weeks)..  Estimated Date of Delivery: 2022 Ultrasound consistent with LMP.    MENSTRUAL HISTORY    irregular   Last PAP:    History of abnormal Pap?  No    Health maintenance updated:  yes        Current medications are:    Current Outpatient Medications:      cyanocobalamin (VITAMIN B-12) 1000 MCG tablet, Take 1 tablet (1,000 mcg) by mouth daily, Disp: 30 tablet, Rfl: 4     doxylamine (UNISOM) 25 MG TABS tablet, Take 1 tablet (25 mg) by mouth At Bedtime, Disp: 30 tablet, Rfl: 0     ferrous gluconate (FERGON) 324 (38 Fe) MG tablet, Take 1 tablet (324 mg) by mouth daily (with breakfast), Disp: 90 tablet, Rfl: 4     ferrous sulfate (FEROSUL) 325 (65 Fe) MG tablet, Take 1 tablet (325 mg) by mouth every other day, Disp: 30 tablet, Rfl: 3     Prenat w/o A-NO-Iiembap-FA-DHA (PNV-DHA) 27-0.6-0.4-300 MG CAPS, Take 1 capsule by mouth daily, Disp: 100 capsule, Rfl: 3     pyridOXINE (VITAMIN B6) 25 MG tablet, Take 1 tablet (25 mg) by mouth 3 times daily, Disp: 90 tablet, Rfl: 3     vitamin C (ASCORBIC ACID) 250 MG tablet, Take 1 tablet  (250 mg) by mouth daily, Disp: 30 tablet, Rfl: 4     What medications are you currently taking? See above    INFECTION HISTORY  HIV: No  Hepatitis B: No  Hepatitis C: No  Tuberculosis: No  Last PPD   Genital Herpes self: no  Herpes partner:  no  Chlamydia:  no  Gonorrhea:  no  HPV: No  BV:  No  Syphilis:  No  Chicken Pox:  Yes -       OB HISTORY  OB History    Para Term  AB Living   3 2 2 0 0 2   SAB IAB Ectopic Multiple Live Births   0 0 0 0 2      # Outcome Date GA Lbr Dhruv/2nd Weight Sex Delivery Anes PTL Lv   3 Current            2 Term 20 40w5d / 00:20 3.45 kg (7 lb 9.7 oz) M Vag-Vacuum IV N SHAHANA      Birth Comments: Shoulder dystocia during labor and delivery [[id number]]      Complications: Shoulder Dystocia, Shoulder dystocia during labor and delivery      Name: Moisés      Apgar1: 6  Apgar5: 8   1 Term 11   3.67 kg (8 lb 1.5 oz) M  None  SHAHANA       History of GDM: No,  PTL : No,  History of HTN in pregnancy: No,  Thrombocytopenia: No,  Shoulder dystocia: Yes ,  Vacuum Extraction: Yes   PPH: No   3rd of 4th degree laceration: No.   Other complications: No     Low Dose Aspirin for Preeclampsia Prevention:  Consider for those with 1 high risk or 2  moderate risk factors     High risk: previous pregnancy with preeclampsia, multifetal gestation, chronic htn, diabetes, chronic kidney disease, autoimmune disorder     Medium risk: nulliparity, BMI >30, family hx preeclampsia, age >/= 35,  race, low SES, personal risk factors (hx low birth weight, hx stillbirth, >10yrs between pregnancies).      Patient does qualify for low dose aspirin therapy        PERSONAL HISTORY  Exercise Habits:  none   Employment: Full time.  Her job involves moderate activity with little potential for toxic exposure.    Travel plans:  are none planned.   Diet: follows a balanced nutrition diet  Prenatal vitamins? Yes:   Fish Oil? Yes:   Abuse concerns? No  Any history if abuse, varbal, physical,  sexual? No  Hgb A1c screen:  BMI > 30: No, 1st degree family DM: No, History of GDM: No, PCOS: No, High risk ethnicity: No    Social History     Socioeconomic History     Marital status:      Spouse name: Mauricio Bennett     Number of children: 1     Years of education: Not on file     Highest education level: 4th grade   Occupational History     Occupation: stay at home   Tobacco Use     Smoking status: Never Smoker     Smokeless tobacco: Never Used   Vaping Use     Vaping Use: Never used   Substance and Sexual Activity     Alcohol use: Never     Drug use: Never     Sexual activity: Yes     Partners: Male   Other Topics Concern     Not on file   Social History Narrative     Not on file     Social Determinants of Health     Financial Resource Strain: Low Risk      Difficulty of Paying Living Expenses: Not hard at all   Food Insecurity: No Food Insecurity     Worried About Running Out of Food in the Last Year: Never true     Ran Out of Food in the Last Year: Never true   Transportation Needs: No Transportation Needs     Lack of Transportation (Medical): No     Lack of Transportation (Non-Medical): No   Physical Activity: Not on file   Stress: Not on file   Social Connections: Not on file   Intimate Partner Violence: Not on file   Housing Stability: Not on file         She  reports that she has never smoked. She has never used smokeless tobacco.    STD testing offered?  Accepted  Last PHQ-9 score on record = No flowsheet data found.  Last GAD7 score on record = No flowsheet data found.  Alcohol Score = 0  Referral/Meds needed? no    PAST MEDICAL/SURGICAL HISTORY  Past Medical History:   Diagnosis Date     No pertinent past medical history 02/17/2022     Past Surgical History:   Procedure Laterality Date     NO HISTORY OF SURGERY  02/17/2022       FAMILY HISTORY  History reviewed. No pertinent family history.      ROS:  CONSTITUTIONAL: NEGATIVE for fever, chills, change in weight  INTEGUMENTARU/SKIN: NEGATIVE for  "worrisome rashes, moles or lesions  EYES: NEGATIVE for vision changes or irritation  ENT: NEGATIVE for ear, mouth and throat problems  RESP: NEGATIVE for significant cough or SOB  BREAST: NEGATIVE for masses, tenderness or discharge  CV: NEGATIVE for chest pain, palpitations or peripheral edema  GI: NEGATIVE for nausea, abdominal pain, heartburn, or change in bowel habits  : NEGATIVE for unusual urinary or vaginal symptoms. Periods are regular.  MUSCULOSKELETAL: NEGATIVE for significant arthralgias or myalgia  NEURO: NEGATIVE for weakness, dizziness or paresthesias  ENDOCRINE: NEGATIVE for temperature intolerance, skin/hair changes  HEME/ALLERGY/IMMUNE: NEGATIVE for bleeding problems  PSYCHIATRIC: NEGATIVE for changes in mood or affect    PHYSICAL EXAM  Vitals: BP 90/56 (BP Location: Right arm, Cuff Size: Adult Regular)   Ht 1.651 m (5' 5\")   Wt 72.5 kg (159 lb 14.4 oz)   LMP 11/17/2021 (Within Weeks)   Breastfeeding No   BMI 26.61 kg/m    BMI= Body mass index is 26.61 kg/m .     GENERAL:  32 year old pleasant pregnant female, alert, cooperative and well groomed.  NECK:  Thyroid without enlargement and nodules.  Lymph nodes not palpable.   LUNGS:  Clear to auscultation.  BREAST:  Deferred  HEART:  RRR without murmur.  ABDOMEN: Soft without masses or tenderness.  No scars noted..  GENITALIA: BUS without tenderness or inflammation.  Perineum without lesions.    VAGINA:  Pink, normal rugae and discharge.  CERVIX:  Deferred   UTERUS: Fundus at umbilicus, 20 cm. Fetal heart tones heard easily  ADNEXA: Without masses or tenderness  RECTAL:  Normal appearance.  Digital exam deferred.  LOWER EXTREMITIES: No edema. No significant varicosities.    ASSESSMENT/PLAN:    IUP at 17w5d      ICD-10-CM    1. Encounter for supervision of other normal pregnancy, second trimester  Z34.82 NEISSERIA GONORRHOEA PCR     CHLAMYDIA TRACHOMATIS PCR     US OB > 14 Weeks   2. Anemia in pregnancy, second trimester  O99.012 ferrous " gluconate (FERGON) 324 (38 Fe) MG tablet   3. Abdominal pain in pregnancy, second trimester  O26.892 UA with Microscopic reflex to Culture - Clinic Collect    R10.9 Urine Microscopic          consult for US for AMA patients: NA  Genetic Testing reviewed and discussed, patient desires to consider further.  Handout provided      COUNSELING    Instructed on use of triage nurse line and contacting the on call CNM after hours in an emergency.     Symptoms of N&V and fatigue usually start to resolve around 12-16 weeks     Reviewed CNM philosophy, call schedule for labor and delivery, and [facility] for delivery    1st OB handout given outlining appointment spacing and CNM information    Reviewed exercise and nutrition    Recommend to gain 25-35 pounds with her pregnancy.    Discussed OTC medications. OB med list given    Encouraged patient to arrange  if needed    Encouraged patient to take PNV's/DHA    Travel precautions discussed, no air travel after 36 weeks and Zika Virus discussed    Will call patient with lab results when available    Does patient desire a RN home visit from the [facility]?  No    If yes, paperwork completed?  No     F/U to be addressed next visit:  Decision regarding genetic testing. Rx's given, referrals    Will return to the clinic in 4 weeks for her next routine prenatal check.  Will call to be seen sooner if problems arise.    JOSELYN Quintero, CNM

## 2022-02-24 LAB
C TRACH DNA SPEC QL NAA+PROBE: NEGATIVE
N GONORRHOEA DNA SPEC QL NAA+PROBE: NEGATIVE

## 2022-03-22 NOTE — PATIENT INSTRUCTIONS
[facility] Nurse Midwives     Contact the on-call CNM with warning signs, such as:    vaginal bleeding     Vaginal discharge and itching or pain and burning during urination    Leg/calf pain or swelling on one side    severe abdominal pain    nausea and vomiting more than 4-5 times a day, or if you are unable to keep anything down    fever more than 100.4 degrees F.     Patient Education     Comprendre le travail prématuré  Commencer le travail tk votre 37e semaine de grossesse est considéré comme du travail prématuré. En jacquelyn de travail prématuré, votre bébé peut naître trop tôt. [place] peut entraîner un certain nombre de problèmes de santé armand peuvent affecter votre bébé.      [place] le travail, le col utérin est épais et fermé.       En jacquelyn de travail prématuré, le col utérin commence à s'efface (rétrécir) et à se dilater (ouvrir).      Symptômes du travail prématuré  Si vous pensez que votre travail commence prématurément, obtenez immédiatement une aide médicale. Le fait d'avoir westley contractions seul ne constitue pas un travail prématuré. Les changements au niveau du col de votre utérus (partie inférieure de l'utérus) importent davantage. Les symptômes du travail prématuré sont :     Quatre contractions par heure ou plus    [place]ley contractions intenses    Crampes constantes similaires aux règles    Douleur dans la partie inférieure du dos    Écoulement vaginal muqueux ou sanguinolent    Saignement ou petite perte sanglante dale le deuxième ou troisième trimestre.  Évaluation du travail prématuré  Votre fournisseur de soins de santé essaiera de définir si votre travail a commencé de manière prématurée ou si vous avez simplement westley contractions. Il ou todd peut vous observer pendant quelques heures. Les tests suivants peuvent être faits.     Examen pelvien pour voir si votre col utérin s'est effacé (rétréci) et dilaté (ouvert)    Surveillance de l'activité utérine pour détecter les  contractions    Surveillance du f tus pour contrôler la santé de votre bébé    Ultrason pour vérifier la taille et la position de votre bébé    Amniocenthèse pour contrôler la maturité darrin poumons de votre bébé  Prendre soin de vous chez vous  Si vous avez darrin contractions prématurées, mais que votre col utérin est toujours épais et fermé, votre fournisseur de soins de santé peut vous demander de faire ce armand suit chez vous :     Buvez en grandes quantités.    Soyez moins active.    Reposez-vous au lit, allongée lan le côté.    Évitez les relations sexuelles et toute stimulation du bout darrin seins.  Quand devez-vous appeler votre fournisseur de soins de santé  Appelez votre fournisseur de soins de santé si vous remarquez les symptômes suivants :    Quatre contractions par heure ou plus    Perte darrin eaux    Saignement ou petite perte sanglante  Si vous avez besoin de soins hospitaliers  Le travail prématuré nécessite souvent darrin soins hospitaliers et un repos total au lit. Il se peut qu'on vous pose une ligne intraveineuse pour vous jay darrin liquides. On vous donnera peut-être darrin pilules ou injections pour éviter les contractions. Finalement, on vous donnera peut-être darrin médicaments (corticostéroïdes) armand aident les poumons de votre bébé à arriver à maturité plus rapidement.   Courez-vous un risque?  Toute femme enceinte peut connaître un déclenchement prématuré du travail. Le travail peut commencer sans raison. Mais isra facteurs de risque peuvent augmenter theo chances :     Antécédents de travail ou de naissance prématurés    Fumer, se droguer ou consommer de l'alcool pendant la grossesse    Présence de plusieurs f tus (jumeaux ou plus)    Forme de l'utérus problématique    Saignement dale la grossesse  Les dangers de la naissance prématurée  Un bébé né trop tôt peut avoir darrin problèmes de santé. Isra problèmes sont liés au fait que le bébé n'a pas eu assez de temps pour atteindre la maturité nécessaire.  Certains darrin risques que coure votre bébé sont :     Allaitement ou alimentation insuffisante    Maturation pulmonaire incomplète    Saignement dans le cerveau    Décès  Atteindre le terme  Votre but est de vous rapprocher autant que possible du terme tk d'accoucher. Plus vous vous en approcherez, plus vous aurez de chance d'avoir un bébé en bonne santé. Collaborez avec votre fournisseur de soins de santé. Ensemble, vous pouvez prendre les mesures armand s'imposent pour éviter un  tk le terme.     [id number] [employer] Tous droits [employer]. Cette information ne vise pas à remplacer darrin soins médicaux professionnels. Suivez toujours les instructions de votre professionnel de la santé.

## 2022-03-22 NOTE — PROGRESS NOTES
Seen with [employer] telephone .   S: Feeling well. Here with her partner. Has started feeling fetal movement.  Denies uterine cramping, vaginal bleeding or leaking of fluid. Reviewed records due to last labor note stating she had shoulder. She has not seen an MD for consult yet.  Her first baby was born back home and there were not any problems per her report. Questions about work limitations.   O: Vitals: LMP 11/17/2021 (Within Weeks)   BMI= Body mass index is 26.79 kg/m .  Exam:  Constitutional: healthy, alert and no distress  Respiratory: respirations even and unlabored  Gastrointestinal: Abdomen soft, non-tender. Fundus measures appropriate for gestational age. Fetal heart tones hear without difficulty and within normal limits  : Deferred  Psychiatric: mentation appears normal and affect normal/bright     (Z34.82) Encounter for supervision of other normal pregnancy in second trimester  (primary encounter diagnosis)  Comment:   Plan:     (O99.012) Anemia in pregnancy, second trimester  Comment:   Plan: CBC with platelets          (Z87.59) History of shoulder dystocia in prior pregnancy  Comment:   Plan: Needs OB consult. Plan at next visit.     20 week fetal screen today. Report is not back yet to formally discuss.   Encouraged patient to call with any questions or concerns.     Jillian Pennington CNM

## 2022-03-23 ENCOUNTER — ANCILLARY PROCEDURE (OUTPATIENT)
Dept: ULTRASOUND IMAGING | Facility: CLINIC | Age: 32
End: 2022-03-23
Attending: ADVANCED PRACTICE MIDWIFE
Payer: COMMERCIAL

## 2022-03-23 ENCOUNTER — PRENATAL OFFICE VISIT (OUTPATIENT)
Dept: MIDWIFE SERVICES | Facility: CLINIC | Age: 32
End: 2022-03-23
Attending: ADVANCED PRACTICE MIDWIFE
Payer: COMMERCIAL

## 2022-03-23 VITALS — WEIGHT: 161 LBS | BODY MASS INDEX: 26.79 KG/M2 | SYSTOLIC BLOOD PRESSURE: 96 MMHG | DIASTOLIC BLOOD PRESSURE: 54 MMHG

## 2022-03-23 DIAGNOSIS — Z34.82 ENCOUNTER FOR SUPERVISION OF OTHER NORMAL PREGNANCY, SECOND TRIMESTER: ICD-10-CM

## 2022-03-23 DIAGNOSIS — Z87.59 HISTORY OF SHOULDER DYSTOCIA IN PRIOR PREGNANCY: ICD-10-CM

## 2022-03-23 DIAGNOSIS — Z34.82 ENCOUNTER FOR SUPERVISION OF OTHER NORMAL PREGNANCY IN SECOND TRIMESTER: Primary | ICD-10-CM

## 2022-03-23 DIAGNOSIS — O99.012 ANEMIA IN PREGNANCY, SECOND TRIMESTER: ICD-10-CM

## 2022-03-23 PROBLEM — Z36.89 ENCOUNTER FOR TRIAGE IN PREGNANT PATIENT: Status: RESOLVED | Noted: 2020-08-31 | Resolved: 2022-03-23

## 2022-03-23 PROBLEM — O09.33 INSUFFICIENT PRENATAL CARE IN THIRD TRIMESTER: Status: RESOLVED | Noted: 2020-08-12 | Resolved: 2022-03-23

## 2022-03-23 LAB
ERYTHROCYTE [DISTWIDTH] IN BLOOD BY AUTOMATED COUNT: 13.6 % (ref 10–15)
HCT VFR BLD AUTO: 30.7 % (ref 35–47)
HGB BLD-MCNC: 10 G/DL (ref 11.7–15.7)
MCH RBC QN AUTO: 30.1 PG (ref 26.5–33)
MCHC RBC AUTO-ENTMCNC: 32.6 G/DL (ref 31.5–36.5)
MCV RBC AUTO: 93 FL (ref 78–100)
PLATELET # BLD AUTO: 219 10E3/UL (ref 150–450)
RBC # BLD AUTO: 3.32 10E6/UL (ref 3.8–5.2)
WBC # BLD AUTO: 7.8 10E3/UL (ref 4–11)

## 2022-03-23 PROCEDURE — 36415 COLL VENOUS BLD VENIPUNCTURE: CPT | Performed by: ADVANCED PRACTICE MIDWIFE

## 2022-03-23 PROCEDURE — 85027 COMPLETE CBC AUTOMATED: CPT | Performed by: ADVANCED PRACTICE MIDWIFE

## 2022-03-23 PROCEDURE — 83020 HEMOGLOBIN ELECTROPHORESIS: CPT | Performed by: ADVANCED PRACTICE MIDWIFE

## 2022-03-23 PROCEDURE — 76816 OB US FOLLOW-UP PER FETUS: CPT | Performed by: OBSTETRICS & GYNECOLOGY

## 2022-03-23 PROCEDURE — 83021 HEMOGLOBIN CHROMOTOGRAPHY: CPT | Performed by: ADVANCED PRACTICE MIDWIFE

## 2022-03-23 PROCEDURE — 99207 PR PRENATAL VISIT: CPT | Performed by: ADVANCED PRACTICE MIDWIFE

## 2022-03-24 DIAGNOSIS — O09.92 SUPERVISION OF HIGH RISK PREGNANCY IN SECOND TRIMESTER: Primary | ICD-10-CM

## 2022-03-24 DIAGNOSIS — O28.3 ABNORMAL FETAL ULTRASOUND: Primary | ICD-10-CM

## 2022-03-25 ENCOUNTER — TRANSCRIBE ORDERS (OUTPATIENT)
Dept: MATERNAL FETAL MEDICINE | Facility: CLINIC | Age: 32
End: 2022-03-25
Payer: COMMERCIAL

## 2022-03-25 DIAGNOSIS — O26.90 PREGNANCY RELATED CONDITION, ANTEPARTUM: Primary | ICD-10-CM

## 2022-03-27 ENCOUNTER — HEALTH MAINTENANCE LETTER (OUTPATIENT)
Age: 32
End: 2022-03-27

## 2022-03-28 LAB
HEMOGLOBIN A2 QUANTITATION: 3.2 % (ref 2.2–3.5)
HEMOGLOBIN ELECTROPHRESIS: NORMAL
HEMOGLOBIN F QUANTITATION: <0.8 % (ref 0–2)
PATH ICD:: NORMAL
REVIEWING PATHOLOGIST: NORMAL

## 2022-03-29 ENCOUNTER — OFFICE VISIT (OUTPATIENT)
Dept: MATERNAL FETAL MEDICINE | Facility: CLINIC | Age: 32
End: 2022-03-29
Attending: ADVANCED PRACTICE MIDWIFE
Payer: COMMERCIAL

## 2022-03-29 ENCOUNTER — HOSPITAL ENCOUNTER (OUTPATIENT)
Dept: ULTRASOUND IMAGING | Facility: CLINIC | Age: 32
Discharge: HOME | End: 2022-03-29
Attending: ADVANCED PRACTICE MIDWIFE
Payer: COMMERCIAL

## 2022-03-29 ENCOUNTER — PRE VISIT (OUTPATIENT)
Dept: MATERNAL FETAL MEDICINE | Facility: CLINIC | Age: 32
End: 2022-03-29
Payer: COMMERCIAL

## 2022-03-29 DIAGNOSIS — O26.90 PREGNANCY RELATED CONDITION, ANTEPARTUM: ICD-10-CM

## 2022-03-29 DIAGNOSIS — O35.9XX0 FETAL ABNORMALITY AFFECTING MANAGEMENT OF MOTHER, ANTEPARTUM, SINGLE OR UNSPECIFIED FETUS: Primary | ICD-10-CM

## 2022-03-29 PROCEDURE — 76811 OB US DETAILED SNGL FETUS: CPT | Mod: 26 | Performed by: OBSTETRICS & GYNECOLOGY

## 2022-03-29 PROCEDURE — 76811 OB US DETAILED SNGL FETUS: CPT

## 2022-03-29 NOTE — PROGRESS NOTES
Please see full imaging report from [employer] program under imaging tab.    Normal and complete anatomic survey    Dong Durand MD  Maternal Fetal Medicine

## 2022-04-28 ENCOUNTER — PRENATAL OFFICE VISIT (OUTPATIENT)
Dept: MIDWIFE SERVICES | Facility: CLINIC | Age: 32
End: 2022-04-28
Payer: COMMERCIAL

## 2022-04-28 VITALS — DIASTOLIC BLOOD PRESSURE: 58 MMHG | SYSTOLIC BLOOD PRESSURE: 96 MMHG | BODY MASS INDEX: 27.79 KG/M2 | WEIGHT: 167 LBS

## 2022-04-28 DIAGNOSIS — R10.2 PELVIC PAIN IN PREGNANCY, ANTEPARTUM, SECOND TRIMESTER: ICD-10-CM

## 2022-04-28 DIAGNOSIS — D64.9 ANEMIA, UNSPECIFIED TYPE: ICD-10-CM

## 2022-04-28 DIAGNOSIS — Z34.82 ENCOUNTER FOR SUPERVISION OF OTHER NORMAL PREGNANCY IN SECOND TRIMESTER: Primary | ICD-10-CM

## 2022-04-28 DIAGNOSIS — O99.012 ANEMIA IN PREGNANCY, SECOND TRIMESTER: ICD-10-CM

## 2022-04-28 DIAGNOSIS — O26.892 PELVIC PAIN IN PREGNANCY, ANTEPARTUM, SECOND TRIMESTER: ICD-10-CM

## 2022-04-28 PROCEDURE — 99207 PR PRENATAL VISIT: CPT | Performed by: ADVANCED PRACTICE MIDWIFE

## 2022-04-28 RX ORDER — ASCORBIC ACID, CHOLECALCIFEROL, .ALPHA.-TOCOPHEROL ACETATE, DL-, PYRIDOXINE, FOLIC ACID, CYANOCOBALAMIN, CALCIUM, FERROUS FUMARATE, MAGNESIUM, DOCONEXENT 85; 200; 10; 25; 1; 12; 140; 27; 45; 300 [IU]/1; [IU]/1; [IU]/1; [IU]/1; MG/1; UG/1; MG/1; MG/1; MG/1; MG/1
1 CAPSULE, GELATIN COATED ORAL DAILY
Qty: 100 CAPSULE | Refills: 3 | Status: SHIPPED | OUTPATIENT
Start: 2022-04-28

## 2022-04-28 RX ORDER — LANOLIN ALCOHOL/MO/W.PET/CERES
1000 CREAM (GRAM) TOPICAL DAILY
Qty: 30 TABLET | Refills: 4 | Status: SHIPPED | OUTPATIENT
Start: 2022-04-28

## 2022-04-28 RX ORDER — MULTIVIT WITH MINERALS/LUTEIN
250 TABLET ORAL DAILY
Qty: 30 TABLET | Refills: 4 | Status: SHIPPED | OUTPATIENT
Start: 2022-04-28

## 2022-04-28 NOTE — PATIENT INSTRUCTIONS
The number to reach the midwives in our clinic has changed. You can now contact them by calling our main clinic number at [phone] and then a message will be forwarded on to them from there. The number given to patients previously, is no longer being used.      The main clinic number is answered 24/7. If it is after normal clinic hours, or on weekends or Holidays our [facility] team will answer.      You can also continue to use [facility] for non-emergent needs.      Thank you,        MEGHAN [facility]

## 2022-04-28 NOTE — PROGRESS NOTES
Bel[place]ian  used throughout visit.      S: Feels well,  Baby active.  Denies vaginal bleeding or leaking of fluid. Having some casimiro cook contractions that resolve with rest. Patient reports she is taking iron, vitamin C and B12.   O: Vitals: LMP 2021 (Within Weeks)   BMI= Body mass index is 27.79 kg/m .  Exam:  Constitutional: healthy, alert and no distress  Respiratory: respirations even and unlabored  Gastrointestinal: Abdomen soft, non-tender. Fundus measures appropriate for gestational age. Fetal heart tones hear without difficulty and within normal limits  : Deferred  Psychiatric: mentation appears normal and affect normal/bright  A:     ICD-10-CM    1. Encounter for supervision of other normal pregnancy in second trimester  Z34.82 CBC with platelets     Glucose tolerance, gest std 100 gm, 3 hr     Breast pump electric     CANCELED: Breast Pump Order for DME - ONLY FOR DME   2. Anemia in pregnancy, second trimester  O99.012 cyanocobalamin (VITAMIN B-12) 1000 MCG tablet     Prenat w/o N-CT-Huqtncs-FA-DHA (PNV-DHA) 27-0.6-0.4-300 MG CAPS     vitamin C (ASCORBIC ACID) 250 MG tablet     CBC with platelets   3. Pelvic pain in pregnancy, antepartum, second trimester  O26.892 Neck/Shoulder/Back/Abdomen Order for DME - ONLY FOR DME    R10.2    4. Anemia, unspecified type  D64.9      P: GCT scheduled for 5/3/22 handout provided.   Plan for Tdap at 28 weeks; reviewed CDC recommendations and partner/family vaccination recommended as well.  Need for Rhogam? No.   Discussed patient options for postpartum contraception. Patient is still considering options.   Reviewed signs of  labor and questions answered.  Needs OB consult for hx of shoulder dystocia; scheduled today with use of .   Prescriptions refilled for anemia medications and will recheck levels at next visit.   Encouraged patient to call with any questions or concerns.   Return to clinic 2 weeks      Daly Delacruz DNP Student    Patient was seen with student,who was present for learning. I personally assessed, examined and made clinical decisions reflected in the documentation  Jillian Pennington CNM

## 2022-05-03 ENCOUNTER — ALLIED HEALTH/NURSE VISIT (OUTPATIENT)
Dept: NURSING | Facility: CLINIC | Age: 32
End: 2022-05-03
Payer: COMMERCIAL

## 2022-05-03 DIAGNOSIS — O09.92 SUPERVISION OF HIGH RISK PREGNANCY IN SECOND TRIMESTER: Primary | ICD-10-CM

## 2022-05-03 PROCEDURE — 99207 PR NO CHARGE NURSE ONLY: CPT

## 2022-05-03 NOTE — NURSING NOTE
"Chief Complaint   Patient presents with     Allied Health Visit     Nurse visit  for GCT and CBC           Initial LMP 2021 (Within Weeks)  Estimated body mass index is 27.79 kg/m  as calculated from the following:    Height as of 22: 1.651 m (5' 5\").    Weight as of 22: 75.8 kg (167 lb).  BP completed using cuff size: NA (Not Taken)    Questioned patient about current smoking habits.  Pt. has never smoked.    27w4d      The following HM Due: NONE      Pt here for Nurse only GCT and CBC .  Pt draw time 4:41 pm         Rebecca Brantley CMA on 5/3/2022 at 3:58 PM             "

## 2022-05-05 ENCOUNTER — PRENATAL OFFICE VISIT (OUTPATIENT)
Dept: OBGYN | Facility: CLINIC | Age: 32
End: 2022-05-05
Payer: COMMERCIAL

## 2022-05-05 VITALS — SYSTOLIC BLOOD PRESSURE: 102 MMHG | WEIGHT: 168 LBS | DIASTOLIC BLOOD PRESSURE: 76 MMHG | BODY MASS INDEX: 27.96 KG/M2

## 2022-05-05 DIAGNOSIS — Z87.59 HISTORY OF SHOULDER DYSTOCIA IN PRIOR PREGNANCY: Primary | ICD-10-CM

## 2022-05-05 PROCEDURE — 99207 PR COMPLICATED OB VISIT: CPT | Performed by: OBSTETRICS & GYNECOLOGY

## 2022-05-05 NOTE — PROGRESS NOTES
SUBJECTIVE:                                                   I was asked to see this patient today for a consult by the CN service for an opinion regarding prior shoulder dystocia and implications for this pregnancy.    Svetlana Sanches is a 32 year old female  who presents to clinic today for consultation regarding previous shoulder dystocia with delivery in . Delivery note reviewed. No history of this with first delivery in , and that infant weighed 8lb2oz; her last delivery, the infant weighed 7lb 10 oz.        Problem list and histories reviewed & adjusted, as indicated.  Additional history: as documented.    Patient Active Problem List   Diagnosis     GBS (group B Streptococcus carrier), +RV culture, currently pregnant     Vacuum-assisted vaginal delivery     Anemia affecting pregnancy in first trimester     Encounter for supervision of other normal pregnancy in second trimester     History of shoulder dystocia in prior pregnancy     Past Surgical History:   Procedure Laterality Date     NO HISTORY OF SURGERY  2022      Social History     Tobacco Use     Smoking status: Never Smoker     Smokeless tobacco: Never Used   Substance Use Topics     Alcohol use: Never      No data available            cyanocobalamin (VITAMIN B-12) 1000 MCG tablet, Take 1 tablet (1,000 mcg) by mouth daily  doxylamine (UNISOM) 25 MG TABS tablet, Take 1 tablet (25 mg) by mouth At Bedtime  ferrous gluconate (FERGON) 324 (38 Fe) MG tablet, Take 1 tablet (324 mg) by mouth daily (with breakfast)  Prenat w/o B-CH-Sbnuada-FA-DHA (PNV-DHA) 27-0.6-0.4-300 MG CAPS, Take 1 capsule by mouth daily  pyridOXINE (VITAMIN B6) 25 MG tablet, Take 1 tablet (25 mg) by mouth 3 times daily  vitamin C (ASCORBIC ACID) 250 MG tablet, Take 1 tablet (250 mg) by mouth daily    No current facility-administered medications on file prior to visit.    No Known Allergies    ROS:  Negative.    OBJECTIVE:     Exam:  Constitutional:   Appearance: Well nourished, well developed alert, in no acute distress  Neurologic/Psychiatric:  Mental Status:  Oriented X3       In-Clinic Test Results:  No results found for this or any previous visit (from the past 24 hour(s)).    ASSESSMENT/PLAN:                                                        ICD-10-CM    1. History of shoulder dystocia in prior pregnancy  Z87.59          Due to history of shoulder dystocia, we did discuss recurrence risk which is probably in the range of 10-15%.  We discussed options of growth ultrasound at 36 weeks, the potential for induction of labor at or shortly after 39 weeks as postterm pregnancy is a risk factor for recurrence, and the option of planned elective  delivery.  We discussed the difficulty in predicting shoulder dystocia.  At this point, she plans to proceed with vaginal delivery attempt.  She should have an ultrasound for growth at 36 weeks. Should she elect not to proceed with induction of labor at 39 weeks, I recommend transfer to the MD service for delivery. If she agrees to induction of labor at 39 weeks, the MD service should be notified on the day of induction.    Due to language barrier, an  was present on the ipad during the history-taking and subsequent discussion with this patient.      Radha Holt MD  [facility]

## 2022-05-05 NOTE — NURSING NOTE
"Chief Complaint   Patient presents with     Prenatal Care       Initial /76   Wt 76.2 kg (168 lb)   LMP 2021 (Within Weeks)   Breastfeeding No   BMI 27.96 kg/m   Estimated body mass index is 27.96 kg/m  as calculated from the following:    Height as of 22: 1.651 m (5' 5\").    Weight as of this encounter: 76.2 kg (168 lb).  BP completed using cuff size: regular    Questioned patient about current smoking habits.  Pt. has never smoked.          27w6d  + FM daily  Consult for history of shoulder dystocia  Rosie Ware LPN               "

## 2022-05-20 ENCOUNTER — PRENATAL OFFICE VISIT (OUTPATIENT)
Dept: MIDWIFE SERVICES | Facility: CLINIC | Age: 32
End: 2022-05-20
Payer: COMMERCIAL

## 2022-05-20 VITALS — WEIGHT: 168.4 LBS | BODY MASS INDEX: 28.02 KG/M2 | SYSTOLIC BLOOD PRESSURE: 96 MMHG | DIASTOLIC BLOOD PRESSURE: 54 MMHG

## 2022-05-20 DIAGNOSIS — Z34.03 ENCOUNTER FOR SUPERVISION OF NORMAL FIRST PREGNANCY IN THIRD TRIMESTER: Primary | ICD-10-CM

## 2022-05-20 DIAGNOSIS — O99.012 ANEMIA IN PREGNANCY, SECOND TRIMESTER: ICD-10-CM

## 2022-05-20 PROCEDURE — 99207 PR PRENATAL VISIT: CPT | Performed by: ADVANCED PRACTICE MIDWIFE

## 2022-05-20 PROCEDURE — 59425 ANTEPARTUM CARE ONLY: CPT | Performed by: ADVANCED PRACTICE MIDWIFE

## 2022-05-20 NOTE — PROGRESS NOTES
S: Feels well and has no concerns. Patient and family are moving to [place].  Baby active.  Denies uterine cramping, vaginal bleeding or leaking of fluid  O: Vitals: BP 96/54 (BP Location: Left arm, Cuff Size: Adult Regular)   Wt 76.4 kg (168 lb 6.4 oz)   LMP 11/17/2021 (Within Weeks)   Breastfeeding No   BMI 28.02 kg/m    BMI= Body mass index is 28.02 kg/m .  Exam:  Constitutional: healthy, alert and no distress  Respiratory: respirations even and unlabored  Gastrointestinal: Abdomen soft, non-tender. Fundus measures appropriate for gestational age. Fetal heart tones hear without difficulty and within normal limits  : Deferred  Psychiatric: mentation appears normal and affect normal/bright  A:     ICD-10-CM    1. Encounter for supervision of normal first pregnancy in third trimester  Z34.03 UA with Microscopic reflex to Culture - Clinic Collect     CANCELED: Wet prep - Clinic Collect   2. Anemia in pregnancy, second trimester  O99.012 CBC with platelets     P: Gave patient a copy of her prenatal record. Advised patient to establish with her new OB provider as soon as possible.    JOSELYN Quintero, CNM

## 2022-09-24 ENCOUNTER — HEALTH MAINTENANCE LETTER (OUTPATIENT)
Age: 32
End: 2022-09-24

## 2023-05-08 ENCOUNTER — HEALTH MAINTENANCE LETTER (OUTPATIENT)
Age: 33
End: 2023-05-08

## 2024-07-14 ENCOUNTER — HEALTH MAINTENANCE LETTER (OUTPATIENT)
Age: 34
End: 2024-07-14

## (undated) RX ORDER — FENTANYL CITRATE 50 UG/ML
INJECTION, SOLUTION INTRAMUSCULAR; INTRAVENOUS
Status: DISPENSED
Start: 2020-09-04

## (undated) RX ORDER — SCOLOPAMINE TRANSDERMAL SYSTEM 1 MG/1
PATCH, EXTENDED RELEASE TRANSDERMAL
Status: DISPENSED
Start: 2020-09-04